# Patient Record
Sex: FEMALE | Race: BLACK OR AFRICAN AMERICAN | Employment: FULL TIME | URBAN - METROPOLITAN AREA
[De-identification: names, ages, dates, MRNs, and addresses within clinical notes are randomized per-mention and may not be internally consistent; named-entity substitution may affect disease eponyms.]

---

## 2018-01-04 RX ORDER — LEVOCETIRIZINE DIHYDROCHLORIDE 5 MG/1
5 TABLET, FILM COATED ORAL 2 TIMES DAILY
COMMUNITY

## 2018-01-08 ENCOUNTER — LABORATORY ENCOUNTER (OUTPATIENT)
Dept: LAB | Facility: HOSPITAL | Age: 43
End: 2018-01-08
Attending: OBSTETRICS & GYNECOLOGY
Payer: COMMERCIAL

## 2018-01-08 DIAGNOSIS — N87.1 CIN II (CERVICAL INTRAEPITHELIAL NEOPLASIA II): ICD-10-CM

## 2018-01-08 LAB
ALBUMIN SERPL-MCNC: 3.8 G/DL (ref 3.5–4.8)
ALP LIVER SERPL-CCNC: 54 U/L (ref 37–98)
ALT SERPL-CCNC: 22 U/L (ref 14–54)
AST SERPL-CCNC: 24 U/L (ref 15–41)
BASOPHILS # BLD AUTO: 0.03 X10(3) UL (ref 0–0.1)
BASOPHILS NFR BLD AUTO: 0.6 %
BILIRUB SERPL-MCNC: 0.3 MG/DL (ref 0.1–2)
BUN BLD-MCNC: 8 MG/DL (ref 8–20)
CALCIUM BLD-MCNC: 8.9 MG/DL (ref 8.3–10.3)
CHLORIDE: 106 MMOL/L (ref 101–111)
CO2: 26 MMOL/L (ref 22–32)
CREAT BLD-MCNC: 0.96 MG/DL (ref 0.55–1.02)
EOSINOPHIL # BLD AUTO: 0.08 X10(3) UL (ref 0–0.3)
EOSINOPHIL NFR BLD AUTO: 1.6 %
ERYTHROCYTE [DISTWIDTH] IN BLOOD BY AUTOMATED COUNT: 15.6 % (ref 11.5–16)
GLUCOSE BLD-MCNC: 84 MG/DL (ref 70–99)
HBV SURFACE AG SERPL QL IA: NONREACTIVE
HCT VFR BLD AUTO: 32.8 % (ref 34–50)
HEPATITIS B SURFACE ANTIGEN INDEX: 0.47
HEPATITIS C VIRUS AB INTERPRETATION: NONREACTIVE
HGB BLD-MCNC: 9.9 G/DL (ref 12–16)
IMMATURE GRANULOCYTE COUNT: 0.01 X10(3) UL (ref 0–1)
IMMATURE GRANULOCYTE RATIO %: 0.2 %
LYMPHOCYTES # BLD AUTO: 1.83 X10(3) UL (ref 0.9–4)
LYMPHOCYTES NFR BLD AUTO: 37.3 %
M PROTEIN MFR SERPL ELPH: 8.4 G/DL (ref 6.1–8.3)
MCH RBC QN AUTO: 23.2 PG (ref 27–33.2)
MCHC RBC AUTO-ENTMCNC: 30.2 G/DL (ref 31–37)
MCV RBC AUTO: 77 FL (ref 81–100)
MONOCYTES # BLD AUTO: 0.53 X10(3) UL (ref 0.1–0.6)
MONOCYTES NFR BLD AUTO: 10.8 %
NEUTROPHIL ABS PRELIM: 2.43 X10 (3) UL (ref 1.3–6.7)
NEUTROPHILS # BLD AUTO: 2.43 X10(3) UL (ref 1.3–6.7)
NEUTROPHILS NFR BLD AUTO: 49.5 %
PLATELET # BLD AUTO: 313 10(3)UL (ref 150–450)
POTASSIUM SERPL-SCNC: 3.6 MMOL/L (ref 3.6–5.1)
RBC # BLD AUTO: 4.26 X10(6)UL (ref 3.8–5.1)
RED CELL DISTRIBUTION WIDTH-SD: 43.2 FL (ref 35.1–46.3)
SODIUM SERPL-SCNC: 138 MMOL/L (ref 136–144)
WBC # BLD AUTO: 4.9 X10(3) UL (ref 4–13)

## 2018-01-08 PROCEDURE — 87340 HEPATITIS B SURFACE AG IA: CPT

## 2018-01-08 PROCEDURE — 80053 COMPREHEN METABOLIC PANEL: CPT

## 2018-01-08 PROCEDURE — 36415 COLL VENOUS BLD VENIPUNCTURE: CPT

## 2018-01-08 PROCEDURE — 86803 HEPATITIS C AB TEST: CPT

## 2018-01-08 PROCEDURE — 87389 HIV-1 AG W/HIV-1&-2 AB AG IA: CPT

## 2018-01-08 PROCEDURE — 85025 COMPLETE CBC W/AUTO DIFF WBC: CPT

## 2018-01-10 ENCOUNTER — ANESTHESIA EVENT (OUTPATIENT)
Dept: SURGERY | Facility: HOSPITAL | Age: 43
End: 2018-01-10
Payer: COMMERCIAL

## 2018-01-10 ENCOUNTER — HOSPITAL ENCOUNTER (OUTPATIENT)
Facility: HOSPITAL | Age: 43
Setting detail: HOSPITAL OUTPATIENT SURGERY
Discharge: HOME | End: 2018-01-10
Attending: OBSTETRICS & GYNECOLOGY | Admitting: OBSTETRICS & GYNECOLOGY
Payer: COMMERCIAL

## 2018-01-10 ENCOUNTER — ANESTHESIA (OUTPATIENT)
Dept: SURGERY | Facility: HOSPITAL | Age: 43
End: 2018-01-10
Payer: COMMERCIAL

## 2018-01-10 ENCOUNTER — SURGERY (OUTPATIENT)
Age: 43
End: 2018-01-10

## 2018-01-10 VITALS
BODY MASS INDEX: 31.81 KG/M2 | SYSTOLIC BLOOD PRESSURE: 115 MMHG | HEART RATE: 84 BPM | HEIGHT: 64 IN | RESPIRATION RATE: 18 BRPM | WEIGHT: 186.31 LBS | OXYGEN SATURATION: 100 % | DIASTOLIC BLOOD PRESSURE: 77 MMHG | TEMPERATURE: 99 F

## 2018-01-10 DIAGNOSIS — N87.1 CIN II (CERVICAL INTRAEPITHELIAL NEOPLASIA II): Primary | ICD-10-CM

## 2018-01-10 LAB
POCT LOT NUMBER: NORMAL
POCT URINE PREGNANCY: NEGATIVE

## 2018-01-10 PROCEDURE — 88305 TISSUE EXAM BY PATHOLOGIST: CPT | Performed by: OBSTETRICS & GYNECOLOGY

## 2018-01-10 PROCEDURE — 88307 TISSUE EXAM BY PATHOLOGIST: CPT | Performed by: OBSTETRICS & GYNECOLOGY

## 2018-01-10 PROCEDURE — 81025 URINE PREGNANCY TEST: CPT | Performed by: OBSTETRICS & GYNECOLOGY

## 2018-01-10 PROCEDURE — 88342 IMHCHEM/IMCYTCHM 1ST ANTB: CPT | Performed by: OBSTETRICS & GYNECOLOGY

## 2018-01-10 PROCEDURE — 0UBC7ZZ EXCISION OF CERVIX, VIA NATURAL OR ARTIFICIAL OPENING: ICD-10-PCS | Performed by: OBSTETRICS & GYNECOLOGY

## 2018-01-10 RX ORDER — SODIUM CHLORIDE, SODIUM LACTATE, POTASSIUM CHLORIDE, CALCIUM CHLORIDE 600; 310; 30; 20 MG/100ML; MG/100ML; MG/100ML; MG/100ML
INJECTION, SOLUTION INTRAVENOUS CONTINUOUS
Status: DISCONTINUED | OUTPATIENT
Start: 2018-01-10 | End: 2018-01-10

## 2018-01-10 RX ORDER — ONDANSETRON 2 MG/ML
4 INJECTION INTRAMUSCULAR; INTRAVENOUS AS NEEDED
Status: DISCONTINUED | OUTPATIENT
Start: 2018-01-10 | End: 2018-01-10

## 2018-01-10 RX ORDER — HYDROCODONE BITARTRATE AND ACETAMINOPHEN 5; 325 MG/1; MG/1
2 TABLET ORAL AS NEEDED
Status: COMPLETED | OUTPATIENT
Start: 2018-01-10 | End: 2018-01-10

## 2018-01-10 RX ORDER — HYDROCODONE BITARTRATE AND ACETAMINOPHEN 5; 325 MG/1; MG/1
1 TABLET ORAL AS NEEDED
Status: COMPLETED | OUTPATIENT
Start: 2018-01-10 | End: 2018-01-10

## 2018-01-10 RX ORDER — NALOXONE HYDROCHLORIDE 0.4 MG/ML
80 INJECTION, SOLUTION INTRAMUSCULAR; INTRAVENOUS; SUBCUTANEOUS AS NEEDED
Status: DISCONTINUED | OUTPATIENT
Start: 2018-01-10 | End: 2018-01-10

## 2018-01-10 NOTE — H&P
[facility]    History and Physical    Romelia Angelucci Patient Status:  Hospital Outpatient Surgery    1975 MRN [id number]   Location [address] [place] [address] PRE OP HOLDING Attending Luli Wilkes   Hosp Day # 0 PCP Camilla Alba MD     Date distress.      Cervical Papanicolaou done within 1 year of adm    Results:     Lab Results  Component Value Date   WBC 4.9 01/08/2018   HGB 9.9 (L) 01/08/2018   HCT 32.8 (L) 01/08/2018   .0 01/08/2018   CREATSERUM 0.96 01/08/2018   BUN 8 01/08/2018

## 2018-01-10 NOTE — ANESTHESIA PREPROCEDURE EVALUATION
PRE-OP EVALUATION    Patient Name: Pilar Cabrera    Pre-op Diagnosis: HSIL/JULIANA 2     Procedure(s):  LOOP ELECTROSURGICAL EXCISION PROCEDURE, ENDOCERVICAL CURETTAGE     Surgeon(s) and Role:     Shakila Ferreira MD - Primary    Pre-op vitals re 01/08/2018   CREATSERUM 0.96 01/08/2018   GLU 84 01/08/2018   CA 8.9 01/08/2018            Airway      Mallampati: I  Mouth opening: >3 FB  TM distance: 4 - 6 cm  Neck ROM: full Cardiovascular      Rhythm: regular  Rate: normal     Dental    No notable den

## 2018-01-11 NOTE — OPERATIVE REPORT
Pre-Operative Diagnosis: HSIL/JULIANA 2      Post-Operative Diagnosis: HSIL/JULIANA 2      Procedure Performed:   Procedure(s):  LOOP ELECTROSURGICAL EXCISION PROCEDURE, ENDOCERVICAL CURETTAGE     Surgeon(s) and Role:     Sofy Hopson MD - Primary

## 2018-01-11 NOTE — BRIEF OP NOTE
Pre-Operative Diagnosis: HSIL/JULIANA 2      Post-Operative Diagnosis: HSIL/JULIANA 2      Procedure Performed:   Procedure(s):  LOOP ELECTROSURGICAL EXCISION PROCEDURE, ENDOCERVICAL CURETTAGE     Surgeon(s) and Role:     Lolita Fitzgerald MD - Primary

## 2018-01-11 NOTE — ANESTHESIA POSTPROCEDURE EVALUATION
2025 [facility] Patient Status:  Hospital Outpatient Surgery   Age/Gender 43year old female MRN [id number]   [facility] SURGERY Attending Prabhjot Haile Day # 0 PCP Kimberlee Joyner MD       Anesthesia Post-

## 2018-11-27 ENCOUNTER — LAB SERVICES (OUTPATIENT)
Dept: OTHER | Age: 43
End: 2018-11-27

## 2018-11-27 ENCOUNTER — HOSPITAL (OUTPATIENT)
Dept: OTHER | Age: 43
End: 2018-11-27
Attending: OBSTETRICS & GYNECOLOGY

## 2018-11-27 LAB
ALBUMIN SERPL-MCNC: 3.5 G/DL (ref 3.6–5.1)
ALBUMIN SERPL-MCNC: 3.5 GM/DL (ref 3.6–5.1)
ALBUMIN/GLOB SERPL: 0.9 {RATIO} (ref 1–2.4)
ALBUMIN/GLOB SERPL: 0.9 {RATIO} (ref 1–2.4)
ALP SERPL-CCNC: 53 UNIT/L (ref 45–117)
ALP SERPL-CCNC: 53 UNITS/L (ref 45–117)
ALT SERPL-CCNC: 20 UNIT/L
ALT SERPL-CCNC: 20 UNITS/L
ANALYZER ANC (IANC): ABNORMAL
ANALYZER ANC (IANC): ABNORMAL
ANION GAP SERPL CALC-SCNC: 8 MMOL/L (ref 10–20)
ANION GAP SERPL CALC-SCNC: 8 MMOL/L (ref 10–20)
AST SERPL-CCNC: 17 UNIT/L
AST SERPL-CCNC: 17 UNITS/L
BILIRUB SERPL-MCNC: 0.2 MG/DL (ref 0.2–1)
BILIRUB SERPL-MCNC: 0.2 MG/DL (ref 0.2–1)
BUN SERPL-MCNC: 11 MG/DL (ref 6–20)
BUN SERPL-MCNC: 11 MG/DL (ref 6–20)
BUN/CREAT SERPL: 12 (ref 7–25)
BUN/CREAT SERPL: 12 (ref 7–25)
CALCIUM SERPL-MCNC: 8.4 MG/DL (ref 8.4–10.2)
CALCIUM SERPL-MCNC: 8.4 MG/DL (ref 8.4–10.2)
CHLORIDE SERPL-SCNC: 109 MMOL/L (ref 98–107)
CHLORIDE: 109 MMOL/L (ref 98–107)
CO2 SERPL-SCNC: 28 MMOL/L (ref 21–32)
CO2 SERPL-SCNC: 28 MMOL/L (ref 21–32)
CREAT SERPL-MCNC: 0.93 MG/DL (ref 0.51–0.95)
CREAT SERPL-MCNC: 0.93 MG/DL (ref 0.51–0.95)
ERYTHROCYTE [DISTWIDTH] IN BLOOD: 18.9 % (ref 11–15)
ERYTHROCYTE [DISTWIDTH] IN BLOOD: 18.9 % (ref 11–15)
GLOBULIN SER-MCNC: 3.8 G/DL (ref 2–4)
GLOBULIN SER-MCNC: 3.8 GM/DL (ref 2–4)
GLUCOSE SERPL-MCNC: 86 MG/DL (ref 65–99)
GLUCOSE SERPL-MCNC: 86 MG/DL (ref 65–99)
HBV SURFACE AG SER QL: NEGATIVE
HBV SURFACE AG SER QL: NEGATIVE
HCG SERPL-ACNC: <2 MUNIT/ML
HCG SERPL-ACNC: <2 MUNITS/ML
HCT VFR BLD CALC: 34 % (ref 36–46.5)
HCV AB SER QL: NEGATIVE
HCV AB SERPL QL IA: NEGATIVE
HEMATOCRIT: 34 % (ref 36–46.5)
HGB BLD-MCNC: 9.9 G/DL (ref 12–15.5)
HGB BLD-MCNC: 9.9 GM/DL (ref 12–15.5)
HIV 1+2 AB+HIV1 P24 AG SERPL QL IA: NONREACTIVE
HIV ANTIGEN/ANTIBODY SCREEN: NONREACTIVE
MCH RBC QN AUTO: 23.1 PG (ref 26–34)
MCH RBC QN AUTO: 23.1 PG (ref 26–34)
MCHC RBC AUTO-ENTMCNC: 29.1 G/DL (ref 32–36.5)
MCHC RBC AUTO-ENTMCNC: 29.1 GM/DL (ref 32–36.5)
MCV RBC AUTO: 79.4 FL (ref 78–100)
MCV RBC AUTO: 79.4 FL (ref 78–100)
NRBC (NRBCRE): 0 /100 WBC
NRBC (NRBCRE): 0 /100 WBC
PLATELET # BLD: 328 K/MCL (ref 140–450)
PLATELET # BLD: 328 THOUSAND/MCL (ref 140–450)
POTASSIUM SERPL-SCNC: 4.1 MMOL/L (ref 3.4–5.1)
POTASSIUM SERPL-SCNC: 4.1 MMOL/L (ref 3.4–5.1)
PROT SERPL-MCNC: 7.3 G/DL (ref 6.4–8.2)
PROT SERPL-MCNC: 7.3 GM/DL (ref 6.4–8.2)
RBC # BLD: 4.28 MIL/MCL (ref 4–5.2)
RBC # BLD: 4.28 MILLION/MCL (ref 4–5.2)
SODIUM SERPL-SCNC: 141 MMOL/L (ref 135–145)
SODIUM SERPL-SCNC: 141 MMOL/L (ref 135–145)
WBC # BLD: 3.1 K/MCL (ref 4.2–11)
WBC # BLD: 3.1 THOUSAND/MCL (ref 4.2–11)

## 2018-11-29 ENCOUNTER — HOSPITAL (OUTPATIENT)
Dept: OTHER | Age: 43
End: 2018-11-29
Attending: OBSTETRICS & GYNECOLOGY

## 2018-11-29 ENCOUNTER — CHARTING TRANS (OUTPATIENT)
Dept: OTHER | Age: 43
End: 2018-11-29

## 2018-12-03 LAB — PATHOLOGIST NAME: NORMAL

## 2019-05-20 ENCOUNTER — WALK IN (OUTPATIENT)
Dept: URGENT CARE | Age: 44
End: 2019-05-20

## 2019-05-20 VITALS
HEART RATE: 80 BPM | BODY MASS INDEX: 30.45 KG/M2 | TEMPERATURE: 98.3 F | HEIGHT: 64 IN | OXYGEN SATURATION: 100 % | WEIGHT: 178.35 LBS

## 2019-05-20 DIAGNOSIS — J40 BRONCHITIS: Primary | ICD-10-CM

## 2019-05-20 PROCEDURE — X1094 NO CHARGE VISIT: HCPCS | Performed by: NURSE PRACTITIONER

## 2020-01-14 ENCOUNTER — OFFICE VISIT (OUTPATIENT)
Dept: FAMILY MEDICINE CLINIC | Facility: CLINIC | Age: 45
End: 2020-01-14
Payer: COMMERCIAL

## 2020-01-14 VITALS
RESPIRATION RATE: 17 BRPM | BODY MASS INDEX: 32.27 KG/M2 | SYSTOLIC BLOOD PRESSURE: 102 MMHG | DIASTOLIC BLOOD PRESSURE: 70 MMHG | HEART RATE: 86 BPM | WEIGHT: 189 LBS | HEIGHT: 64 IN

## 2020-01-14 DIAGNOSIS — Z00.00 ENCOUNTER FOR PREVENTIVE CARE: ICD-10-CM

## 2020-01-14 DIAGNOSIS — Z87.09 HISTORY OF ASTHMA: ICD-10-CM

## 2020-01-14 DIAGNOSIS — Z13.29 THYROID DISORDER SCREEN: ICD-10-CM

## 2020-01-14 DIAGNOSIS — M77.11 LATERAL EPICONDYLITIS OF RIGHT ELBOW: ICD-10-CM

## 2020-01-14 DIAGNOSIS — Z13.220 LIPID SCREENING: ICD-10-CM

## 2020-01-14 DIAGNOSIS — Z76.89 ESTABLISHING CARE WITH NEW DOCTOR, ENCOUNTER FOR: Primary | ICD-10-CM

## 2020-01-14 PROCEDURE — 99396 PREV VISIT EST AGE 40-64: CPT | Performed by: FAMILY MEDICINE

## 2020-01-14 NOTE — PROGRESS NOTES
HPI:    Patient ID: Jaden Prajapati is a 40year old female.     Patient is a 66-year-old -American female here for establishing care physical and for status update on any confirmed chronic medical illnesses and follow up on any previous labs or pr sounds normal.   Abdominal: Soft. Bowel sounds are normal. She exhibits no distension. Neurological: She is alert and oriented to person, place, and time. ASSESSMENT/PLAN:   1. Establishing care with new doctor, encounter for  Established.

## 2020-01-20 ENCOUNTER — LAB ENCOUNTER (OUTPATIENT)
Dept: LAB | Age: 45
End: 2020-01-20
Attending: FAMILY MEDICINE
Payer: COMMERCIAL

## 2020-01-20 DIAGNOSIS — Z13.29 THYROID DISORDER SCREEN: ICD-10-CM

## 2020-01-20 DIAGNOSIS — Z13.220 LIPID SCREENING: ICD-10-CM

## 2020-01-20 DIAGNOSIS — Z00.00 ENCOUNTER FOR PREVENTIVE CARE: ICD-10-CM

## 2020-01-20 LAB
ALBUMIN SERPL-MCNC: 3.4 G/DL (ref 3.4–5)
ALBUMIN/GLOB SERPL: 0.8 {RATIO} (ref 1–2)
ALP LIVER SERPL-CCNC: 48 U/L (ref 37–98)
ALT SERPL-CCNC: 18 U/L (ref 13–56)
ANION GAP SERPL CALC-SCNC: 4 MMOL/L (ref 0–18)
AST SERPL-CCNC: 15 U/L (ref 15–37)
BASOPHILS # BLD AUTO: 0.02 X10(3) UL (ref 0–0.2)
BASOPHILS NFR BLD AUTO: 0.5 %
BILIRUB SERPL-MCNC: 0.3 MG/DL (ref 0.1–2)
BILIRUB UR QL: NEGATIVE
BUN BLD-MCNC: 9 MG/DL (ref 7–18)
BUN/CREAT SERPL: 8.8 (ref 10–20)
CALCIUM BLD-MCNC: 8.1 MG/DL (ref 8.5–10.1)
CHLORIDE SERPL-SCNC: 111 MMOL/L (ref 98–112)
CHOLEST SMN-MCNC: 170 MG/DL (ref ?–200)
CLARITY UR: CLEAR
CO2 SERPL-SCNC: 26 MMOL/L (ref 21–32)
COLOR UR: YELLOW
CREAT BLD-MCNC: 1.02 MG/DL (ref 0.55–1.02)
DEPRECATED RDW RBC AUTO: 43.7 FL (ref 35.1–46.3)
EOSINOPHIL # BLD AUTO: 0.08 X10(3) UL (ref 0–0.7)
EOSINOPHIL NFR BLD AUTO: 2.1 %
ERYTHROCYTE [DISTWIDTH] IN BLOOD BY AUTOMATED COUNT: 12.8 % (ref 11–15)
GLOBULIN PLAS-MCNC: 4.2 G/DL (ref 2.8–4.4)
GLUCOSE BLD-MCNC: 93 MG/DL (ref 70–99)
GLUCOSE UR-MCNC: NEGATIVE MG/DL
HCT VFR BLD AUTO: 41.2 % (ref 35–48)
HDLC SERPL-MCNC: 44 MG/DL (ref 40–59)
HGB BLD-MCNC: 13.4 G/DL (ref 12–16)
HGB UR QL STRIP.AUTO: NEGATIVE
IMM GRANULOCYTES # BLD AUTO: 0.01 X10(3) UL (ref 0–1)
IMM GRANULOCYTES NFR BLD: 0.3 %
KETONES UR-MCNC: NEGATIVE MG/DL
LDLC SERPL CALC-MCNC: 110 MG/DL (ref ?–100)
LEUKOCYTE ESTERASE UR QL STRIP.AUTO: NEGATIVE
LYMPHOCYTES # BLD AUTO: 1.42 X10(3) UL (ref 1–4)
LYMPHOCYTES NFR BLD AUTO: 36.7 %
M PROTEIN MFR SERPL ELPH: 7.6 G/DL (ref 6.4–8.2)
MCH RBC QN AUTO: 30 PG (ref 26–34)
MCHC RBC AUTO-ENTMCNC: 32.5 G/DL (ref 31–37)
MCV RBC AUTO: 92.2 FL (ref 80–100)
MONOCYTES # BLD AUTO: 0.58 X10(3) UL (ref 0.1–1)
MONOCYTES NFR BLD AUTO: 15 %
NEUTROPHILS # BLD AUTO: 1.76 X10 (3) UL (ref 1.5–7.7)
NEUTROPHILS # BLD AUTO: 1.76 X10(3) UL (ref 1.5–7.7)
NEUTROPHILS NFR BLD AUTO: 45.4 %
NITRITE UR QL STRIP.AUTO: NEGATIVE
NONHDLC SERPL-MCNC: 126 MG/DL (ref ?–130)
OSMOLALITY SERPL CALC.SUM OF ELEC: 290 MOSM/KG (ref 275–295)
PATIENT FASTING Y/N/NP: YES
PATIENT FASTING Y/N/NP: YES
PH UR: 6 [PH] (ref 5–8)
PLATELET # BLD AUTO: 272 10(3)UL (ref 150–450)
POTASSIUM SERPL-SCNC: 4 MMOL/L (ref 3.5–5.1)
PROT UR-MCNC: NEGATIVE MG/DL
RBC # BLD AUTO: 4.47 X10(6)UL (ref 3.8–5.3)
SODIUM SERPL-SCNC: 141 MMOL/L (ref 136–145)
SP GR UR STRIP: 1.02 (ref 1–1.03)
TRIGL SERPL-MCNC: 78 MG/DL (ref 30–149)
TSI SER-ACNC: 1.06 MIU/ML (ref 0.36–3.74)
UROBILINOGEN UR STRIP-ACNC: <2
VLDLC SERPL CALC-MCNC: 16 MG/DL (ref 0–30)
WBC # BLD AUTO: 3.9 X10(3) UL (ref 4–11)

## 2020-01-20 PROCEDURE — 80053 COMPREHEN METABOLIC PANEL: CPT

## 2020-01-20 PROCEDURE — 84443 ASSAY THYROID STIM HORMONE: CPT

## 2020-01-20 PROCEDURE — 80061 LIPID PANEL: CPT

## 2020-01-20 PROCEDURE — 85025 COMPLETE CBC W/AUTO DIFF WBC: CPT

## 2020-01-20 PROCEDURE — 36415 COLL VENOUS BLD VENIPUNCTURE: CPT

## 2020-01-20 PROCEDURE — 81003 URINALYSIS AUTO W/O SCOPE: CPT

## 2020-02-18 ENCOUNTER — TELEPHONE (OUTPATIENT)
Dept: FAMILY MEDICINE CLINIC | Facility: CLINIC | Age: 45
End: 2020-02-18

## 2020-02-18 DIAGNOSIS — E55.9 VITAMIN D INSUFFICIENCY: ICD-10-CM

## 2020-02-18 DIAGNOSIS — E78.00 ELEVATED LDL CHOLESTEROL LEVEL: Primary | ICD-10-CM

## 2020-02-18 NOTE — TELEPHONE ENCOUNTER
Dr Ana María Cerda, please advise. Order for vitamin D pended, please add diagnosis, advise. Advised patient on Dr. Tamela Liang information and recommendations. Patient verbalized understanding.  She asked if she could get a vitamin D, she said she has had a low

## 2020-04-27 ENCOUNTER — PATIENT MESSAGE (OUTPATIENT)
Dept: FAMILY MEDICINE CLINIC | Facility: CLINIC | Age: 45
End: 2020-04-27

## 2020-04-27 DIAGNOSIS — E55.9 VITAMIN D DEFICIENCY: Primary | ICD-10-CM

## 2020-04-27 RX ORDER — ERGOCALCIFEROL 1.25 MG/1
50000 CAPSULE ORAL WEEKLY
Qty: 12 CAPSULE | Refills: 0 | Status: SHIPPED | OUTPATIENT
Start: 2020-04-27 | End: 2020-05-27

## 2020-04-27 NOTE — TELEPHONE ENCOUNTER
From: Blanka Jaimes  To: Jamar Dubon DO  Sent: 4/27/2020 10:24 AM CDT  Subject: Test Results Question    Hi Dr. Lacy Landis,    I pray all is well. I had my Vitamin D level checked last month with other IVF bloodwork. It is 23.  I've been [employer]

## 2020-04-27 NOTE — TELEPHONE ENCOUNTER
From: Karyle Peals  To: Chdii Srivastava, DO  Sent: 4/27/2020 5:45 PM CDT  Subject: Other    Yes, I am okay with taking the additional amount of Vitamin D. Thank you Dr. Lisandra Desir

## 2020-08-14 ENCOUNTER — APPOINTMENT (OUTPATIENT)
Dept: LAB | Facility: REFERENCE LAB | Age: 45
End: 2020-08-14
Attending: FAMILY MEDICINE
Payer: COMMERCIAL

## 2020-08-14 DIAGNOSIS — E55.9 VITAMIN D INSUFFICIENCY: ICD-10-CM

## 2020-08-14 DIAGNOSIS — E78.00 ELEVATED LDL CHOLESTEROL LEVEL: ICD-10-CM

## 2020-08-14 LAB
25(OH)D3 SERPL-MCNC: 49 NG/ML (ref 30–100)
CHOLEST SMN-MCNC: 148 MG/DL (ref ?–200)
HDLC SERPL-MCNC: 46 MG/DL (ref 40–59)
LDLC SERPL CALC-MCNC: 84 MG/DL (ref ?–100)
NONHDLC SERPL-MCNC: 102 MG/DL (ref ?–130)
PATIENT FASTING Y/N/NP: NO
TRIGL SERPL-MCNC: 92 MG/DL (ref 30–149)
VLDLC SERPL CALC-MCNC: 18 MG/DL (ref 0–30)

## 2020-08-14 PROCEDURE — 80061 LIPID PANEL: CPT

## 2020-08-14 PROCEDURE — 36415 COLL VENOUS BLD VENIPUNCTURE: CPT

## 2020-08-14 PROCEDURE — 82306 VITAMIN D 25 HYDROXY: CPT

## 2020-09-05 ENCOUNTER — APPOINTMENT (OUTPATIENT)
Dept: LAB | Facility: HOSPITAL | Age: 45
End: 2020-09-05
Attending: OBSTETRICS & GYNECOLOGY
Payer: COMMERCIAL

## 2020-09-05 DIAGNOSIS — Z01.812 PRE-OPERATIVE LABORATORY EXAMINATION: ICD-10-CM

## 2020-09-07 LAB — SARS-COV-2 RNA RESP QL NAA+PROBE: NOT DETECTED

## 2020-09-30 ENCOUNTER — IMMUNIZATION (OUTPATIENT)
Dept: FAMILY MEDICINE CLINIC | Facility: CLINIC | Age: 45
End: 2020-09-30
Payer: COMMERCIAL

## 2020-09-30 DIAGNOSIS — Z23 NEED FOR VACCINATION: ICD-10-CM

## 2020-09-30 PROCEDURE — 90686 IIV4 VACC NO PRSV 0.5 ML IM: CPT | Performed by: FAMILY MEDICINE

## 2020-09-30 PROCEDURE — 90471 IMMUNIZATION ADMIN: CPT | Performed by: FAMILY MEDICINE

## 2021-03-12 DIAGNOSIS — Z23 NEED FOR VACCINATION: ICD-10-CM

## 2021-03-18 ENCOUNTER — OFFICE VISIT (OUTPATIENT)
Dept: FAMILY MEDICINE CLINIC | Facility: CLINIC | Age: 46
End: 2021-03-18
Payer: COMMERCIAL

## 2021-03-18 VITALS
TEMPERATURE: 97 F | HEIGHT: 64 IN | DIASTOLIC BLOOD PRESSURE: 79 MMHG | BODY MASS INDEX: 32.44 KG/M2 | WEIGHT: 190 LBS | SYSTOLIC BLOOD PRESSURE: 115 MMHG | HEART RATE: 78 BPM

## 2021-03-18 DIAGNOSIS — Z00.00 PHYSICAL EXAM, ROUTINE: Primary | ICD-10-CM

## 2021-03-18 PROCEDURE — 3074F SYST BP LT 130 MM HG: CPT | Performed by: FAMILY MEDICINE

## 2021-03-18 PROCEDURE — 3008F BODY MASS INDEX DOCD: CPT | Performed by: FAMILY MEDICINE

## 2021-03-18 PROCEDURE — 99396 PREV VISIT EST AGE 40-64: CPT | Performed by: FAMILY MEDICINE

## 2021-03-18 PROCEDURE — 3078F DIAST BP <80 MM HG: CPT | Performed by: FAMILY MEDICINE

## 2021-03-18 PROCEDURE — 93000 ELECTROCARDIOGRAM COMPLETE: CPT | Performed by: FAMILY MEDICINE

## 2021-03-18 NOTE — PATIENT INSTRUCTIONS
All adult screening ordered and done appropriate for patient's age and gender and risk factors and complaints. Monitor blood pressures and record at home. Limit salt intake. Encouraged physical fitness and daily physical activity daily.   Comply with medi

## 2021-03-18 NOTE — PROGRESS NOTES
HPI/Subjective:   Patient ID: Mae Nobles is a 39year old female.     This patient is a 51-year-old -American female here for complete preventive care physical and for status update on any confirmed chronic medical illnesses and follow up on a present. Mental Status: She is alert and oriented to person, place, and time. Assessment & Plan:   1. Physical exam, routine  General well exam the following have been ordered. Colonoscopy for women  - LIPID PANEL;  Future  - COMP METABOLIC PA

## 2021-03-19 ENCOUNTER — LAB ENCOUNTER (OUTPATIENT)
Dept: LAB | Age: 46
End: 2021-03-19
Attending: FAMILY MEDICINE
Payer: COMMERCIAL

## 2021-03-19 DIAGNOSIS — Z00.00 PHYSICAL EXAM, ROUTINE: ICD-10-CM

## 2021-03-19 LAB
ALBUMIN SERPL-MCNC: 3.8 G/DL (ref 3.4–5)
ALBUMIN/GLOB SERPL: 1 {RATIO} (ref 1–2)
ALP LIVER SERPL-CCNC: 59 U/L
ALT SERPL-CCNC: 33 U/L
ANION GAP SERPL CALC-SCNC: 1 MMOL/L (ref 0–18)
AST SERPL-CCNC: 26 U/L (ref 15–37)
BASOPHILS # BLD AUTO: 0.04 X10(3) UL (ref 0–0.2)
BASOPHILS NFR BLD AUTO: 1.1 %
BILIRUB SERPL-MCNC: 0.4 MG/DL (ref 0.1–2)
BILIRUB UR QL: NEGATIVE
BUN BLD-MCNC: 9 MG/DL (ref 7–18)
BUN/CREAT SERPL: 9.3 (ref 10–20)
CALCIUM BLD-MCNC: 9.3 MG/DL (ref 8.5–10.1)
CHLORIDE SERPL-SCNC: 108 MMOL/L (ref 98–112)
CHOLEST SMN-MCNC: 162 MG/DL (ref ?–200)
CLARITY UR: CLEAR
CO2 SERPL-SCNC: 31 MMOL/L (ref 21–32)
COLOR UR: YELLOW
CREAT BLD-MCNC: 0.97 MG/DL
DEPRECATED RDW RBC AUTO: 42.7 FL (ref 35.1–46.3)
EOSINOPHIL # BLD AUTO: 0.1 X10(3) UL (ref 0–0.7)
EOSINOPHIL NFR BLD AUTO: 2.7 %
ERYTHROCYTE [DISTWIDTH] IN BLOOD BY AUTOMATED COUNT: 12.6 % (ref 11–15)
GLOBULIN PLAS-MCNC: 4 G/DL (ref 2.8–4.4)
GLUCOSE BLD-MCNC: 95 MG/DL (ref 70–99)
GLUCOSE UR-MCNC: NEGATIVE MG/DL
HCT VFR BLD AUTO: 42.4 %
HDLC SERPL-MCNC: 40 MG/DL (ref 40–59)
HGB BLD-MCNC: 13.5 G/DL
IMM GRANULOCYTES # BLD AUTO: 0.01 X10(3) UL (ref 0–1)
IMM GRANULOCYTES NFR BLD: 0.3 %
KETONES UR-MCNC: NEGATIVE MG/DL
LDLC SERPL CALC-MCNC: 100 MG/DL (ref ?–100)
LEUKOCYTE ESTERASE UR QL STRIP.AUTO: NEGATIVE
LYMPHOCYTES # BLD AUTO: 1.37 X10(3) UL (ref 1–4)
LYMPHOCYTES NFR BLD AUTO: 36.8 %
M PROTEIN MFR SERPL ELPH: 7.8 G/DL (ref 6.4–8.2)
MCH RBC QN AUTO: 29.6 PG (ref 26–34)
MCHC RBC AUTO-ENTMCNC: 31.8 G/DL (ref 31–37)
MCV RBC AUTO: 93 FL
MONOCYTES # BLD AUTO: 0.5 X10(3) UL (ref 0.1–1)
MONOCYTES NFR BLD AUTO: 13.4 %
NEUTROPHILS # BLD AUTO: 1.7 X10 (3) UL (ref 1.5–7.7)
NEUTROPHILS # BLD AUTO: 1.7 X10(3) UL (ref 1.5–7.7)
NEUTROPHILS NFR BLD AUTO: 45.7 %
NITRITE UR QL STRIP.AUTO: NEGATIVE
NONHDLC SERPL-MCNC: 122 MG/DL (ref ?–130)
OSMOLALITY SERPL CALC.SUM OF ELEC: 288 MOSM/KG (ref 275–295)
PATIENT FASTING Y/N/NP: YES
PATIENT FASTING Y/N/NP: YES
PH UR: 7 [PH] (ref 5–8)
PLATELET # BLD AUTO: 288 10(3)UL (ref 150–450)
POTASSIUM SERPL-SCNC: 3.8 MMOL/L (ref 3.5–5.1)
PROT UR-MCNC: NEGATIVE MG/DL
RBC # BLD AUTO: 4.56 X10(6)UL
SODIUM SERPL-SCNC: 140 MMOL/L (ref 136–145)
SP GR UR STRIP: 1.01 (ref 1–1.03)
TRIGL SERPL-MCNC: 110 MG/DL (ref 30–149)
TSI SER-ACNC: 0.88 MIU/ML (ref 0.36–3.74)
UROBILINOGEN UR STRIP-ACNC: <2
VLDLC SERPL CALC-MCNC: 22 MG/DL (ref 0–30)
WBC # BLD AUTO: 3.7 X10(3) UL (ref 4–11)

## 2021-03-19 PROCEDURE — 36415 COLL VENOUS BLD VENIPUNCTURE: CPT

## 2021-03-19 PROCEDURE — 81001 URINALYSIS AUTO W/SCOPE: CPT

## 2021-03-19 PROCEDURE — 80061 LIPID PANEL: CPT

## 2021-03-19 PROCEDURE — 80053 COMPREHEN METABOLIC PANEL: CPT

## 2021-03-19 PROCEDURE — 85025 COMPLETE CBC W/AUTO DIFF WBC: CPT

## 2021-03-19 PROCEDURE — 84443 ASSAY THYROID STIM HORMONE: CPT

## 2021-10-06 ENCOUNTER — NURSE TRIAGE (OUTPATIENT)
Dept: FAMILY MEDICINE CLINIC | Facility: CLINIC | Age: 46
End: 2021-10-06

## 2021-10-06 NOTE — TELEPHONE ENCOUNTER
----- Message from [facility]. Sebastian Birmingham sent at 10/6/2021 10:42 AM CDT -----  Regarding: Throat pain  Hi Dr. Abdias Lane,     I am experiencing some throat discomfort that has gotten worse over the last 2 days. I had some dental work done last Thursday (bone murali) and have been taking antibiotics, ibuprofen 600 and tylenol 3 since. I initially thought I was having a strange reaction but am wondering if I have strep throat. It now hurts to swallow. Could I schedule an appointment to test for it?     ~Kaur Birmingham C/o r foot pain, nausea since today

## 2021-10-07 ENCOUNTER — HOSPITAL ENCOUNTER (OUTPATIENT)
Age: 46
Discharge: HOME | End: 2021-10-07
Payer: COMMERCIAL

## 2021-10-07 VITALS
HEART RATE: 81 BPM | OXYGEN SATURATION: 100 % | SYSTOLIC BLOOD PRESSURE: 115 MMHG | RESPIRATION RATE: 19 BRPM | DIASTOLIC BLOOD PRESSURE: 74 MMHG | TEMPERATURE: 98 F

## 2021-10-07 DIAGNOSIS — Z20.822 ENCOUNTER FOR SCREENING LABORATORY TESTING FOR COVID-19 VIRUS: Primary | ICD-10-CM

## 2021-10-07 DIAGNOSIS — Z20.822 LAB TEST NEGATIVE FOR COVID-19 VIRUS: ICD-10-CM

## 2021-10-07 DIAGNOSIS — J02.9 VIRAL PHARYNGITIS: ICD-10-CM

## 2021-10-07 PROCEDURE — U0002 COVID-19 LAB TEST NON-CDC: HCPCS | Performed by: NURSE PRACTITIONER

## 2021-10-07 PROCEDURE — 99203 OFFICE O/P NEW LOW 30 MIN: CPT | Performed by: NURSE PRACTITIONER

## 2021-10-07 PROCEDURE — 87880 STREP A ASSAY W/OPTIC: CPT | Performed by: NURSE PRACTITIONER

## 2021-10-07 NOTE — ED PROVIDER NOTES
Patient Seen in: Im[facility]      History   Patient presents with:  Sore Throat  Testing    Stated Complaint: Sore throat; Covid and strep test    Subjective:   HPI    This is a 80-year-old female presenting with a sore throat.   Patient state Rate and Rhythm: Normal rate. Heart sounds: Normal heart sounds. Pulmonary:      Effort: Pulmonary effort is normal.      Breath sounds: Normal breath sounds. Musculoskeletal:         General: Normal range of motion.       Cervical back: Normal ra

## 2021-11-03 ENCOUNTER — TELEPHONE (OUTPATIENT)
Dept: FAMILY MEDICINE CLINIC | Facility: CLINIC | Age: 46
End: 2021-11-03

## 2021-11-03 DIAGNOSIS — Z12.11 SCREENING FOR COLON CANCER: Primary | ICD-10-CM

## 2021-11-03 NOTE — TELEPHONE ENCOUNTER
Referral on chart for GI for colonoscopy. Call patient. Patient is not in a high risk category regarding the need for COVID-19 booster. No booster needed for now.

## 2021-11-03 NOTE — TELEPHONE ENCOUNTER
Patient is asking for an order to be put in for her colonoscopy and if she can be notified when it is avail for her to call and schedule,, she is also wanting to know if dr. Ct Summers recommends her to get the J and J booster shot.       Pls advise

## 2021-11-03 NOTE — TELEPHONE ENCOUNTER
Dr. Ana María Cerda, patient is requesting referral for Colonoscopy. Referral pended. Patient is asking if she should receive the [employer] booster vaccine? Please advise.

## 2021-11-09 ENCOUNTER — IMMUNIZATION (OUTPATIENT)
Dept: FAMILY MEDICINE CLINIC | Facility: CLINIC | Age: 46
End: 2021-11-09
Payer: COMMERCIAL

## 2021-11-09 DIAGNOSIS — Z23 NEED FOR VACCINATION: Primary | ICD-10-CM

## 2021-11-09 PROCEDURE — 90471 IMMUNIZATION ADMIN: CPT | Performed by: FAMILY MEDICINE

## 2021-11-09 PROCEDURE — 90686 IIV4 VACC NO PRSV 0.5 ML IM: CPT | Performed by: FAMILY MEDICINE

## 2021-11-18 ENCOUNTER — PATIENT MESSAGE (OUTPATIENT)
Dept: FAMILY MEDICINE CLINIC | Facility: CLINIC | Age: 46
End: 2021-11-18

## 2021-11-18 NOTE — TELEPHONE ENCOUNTER
The medical records generated by this patient's dentist have been reviewed and they definitely warrant testing to rule out sleep apnea. An order for home sleep study has been placed on the chart.   Please call the patient and let her know and a message can

## 2021-11-19 NOTE — TELEPHONE ENCOUNTER
Note from Dr. Fernando Plascencia on Home Sleep Study order:     This is a 70-year-old female patient with a myriad of clinical findings by her dentist and subjective complaints including snoring and frequent night awakenings and daytime somnolence which is highly sug

## 2021-11-22 ENCOUNTER — PATIENT MESSAGE (OUTPATIENT)
Dept: FAMILY MEDICINE CLINIC | Facility: CLINIC | Age: 46
End: 2021-11-22

## 2021-11-26 ENCOUNTER — NURSE ONLY (OUTPATIENT)
Dept: GASTROENTEROLOGY | Facility: CLINIC | Age: 46
End: 2021-11-26

## 2021-11-26 ENCOUNTER — TELEPHONE (OUTPATIENT)
Dept: FAMILY MEDICINE CLINIC | Facility: CLINIC | Age: 46
End: 2021-11-26

## 2021-11-26 DIAGNOSIS — Z12.12 SCREENING FOR COLORECTAL CANCER: Primary | ICD-10-CM

## 2021-11-26 DIAGNOSIS — Z12.11 SCREENING FOR COLORECTAL CANCER: Primary | ICD-10-CM

## 2021-11-26 NOTE — TELEPHONE ENCOUNTER
Sleep Center - Please call patient to schedule. Voicemail left today with your department regarding this patient. Melissa Dewey RN - [place]. Also note 11/18/21 and 11/22/21 Patient Messages. Patient called office. RN spoke with patient.  Patient's date of

## 2021-11-26 NOTE — PROGRESS NOTES
Dr. Orlando Stringer patient for her scheduled telephone colon screening.      3/19/2021 CBC WNL    First colonoscopy     Anticoagulants: no   Diabetic Meds:  No   BP meds(Ace inhibitors/ARB's): no   Weight loss meds (phentermine/vyvanse): no   Iron suppl

## 2021-11-29 RX ORDER — SODIUM, POTASSIUM,MAG SULFATES 17.5-3.13G
SOLUTION, RECONSTITUTED, ORAL ORAL
Qty: 1 EACH | Refills: 0 | Status: SHIPPED | OUTPATIENT
Start: 2021-11-29

## 2021-11-30 ENCOUNTER — TELEPHONE (OUTPATIENT)
Dept: FAMILY MEDICINE CLINIC | Facility: CLINIC | Age: 46
End: 2021-11-30

## 2021-11-30 NOTE — TELEPHONE ENCOUNTER
The note has been signed and please call the patient and let her know that I will try to call her between 5 PM and 6 PM on my commute to home on today which is Tuesday, November 30, 2021.

## 2021-11-30 NOTE — TELEPHONE ENCOUNTER
Patient states that she is having anxiety about her dads recent death. She made an appointment to see Dr. Bonny Wood for next month. She is requesting a note for her job to excuse her from work and will be faxing [facility] paper work for herself.  Note pending

## 2021-11-30 NOTE — PROGRESS NOTES
Thank you Devika Chun. Office visit with Dr. Melissa Pitts 3/18/2021 reviewed.     GI schedulers –    Please schedule colonoscopy exam at [facility] (Singh Cespedes)    BMI Readings from Last 1 Encounters:  03/18/21 : 32.61 kg/m²     M

## 2021-11-30 NOTE — PROGRESS NOTES
Scheduled for:  Colonoscopy [address]    Provider Name:  Dr. Amber Tavares   Date:  3/22/2022  Location:  [facility] [place]  Sedation:  MAC  Time:  9:30 am (pt is aware to arrive at 8:30 am)  Prep:  Split dose Suprep  Meds/Allergies Reconciled?:  Physician reviewed  Diagnosis with

## 2021-12-03 ENCOUNTER — TELEPHONE (OUTPATIENT)
Dept: FAMILY MEDICINE CLINIC | Facility: CLINIC | Age: 46
End: 2021-12-03

## 2021-12-03 NOTE — TELEPHONE ENCOUNTER
Spoke with patient for more info. States she was expecting a call from Dr. Kiersten Zaman on 11/30 after his clinic, see telephone encounter 11/30.   She was able to access the letter on [employer] but \"there is more to it than that and I want the doctor to call m

## 2021-12-10 NOTE — TELEPHONE ENCOUNTER
Called the Sleep Study department to f/u and make sure patient was contacted. Appointment confirmed, patient will go in February to obtain supplies.

## 2021-12-13 ENCOUNTER — OFFICE VISIT (OUTPATIENT)
Dept: FAMILY MEDICINE CLINIC | Facility: CLINIC | Age: 46
End: 2021-12-13
Payer: COMMERCIAL

## 2021-12-13 ENCOUNTER — LAB ENCOUNTER (OUTPATIENT)
Dept: LAB | Age: 46
End: 2021-12-13
Attending: FAMILY MEDICINE
Payer: COMMERCIAL

## 2021-12-13 VITALS
DIASTOLIC BLOOD PRESSURE: 75 MMHG | WEIGHT: 187 LBS | SYSTOLIC BLOOD PRESSURE: 122 MMHG | BODY MASS INDEX: 31.92 KG/M2 | HEART RATE: 88 BPM | HEIGHT: 64 IN | RESPIRATION RATE: 18 BRPM

## 2021-12-13 DIAGNOSIS — E55.9 VITAMIN D INSUFFICIENCY: ICD-10-CM

## 2021-12-13 DIAGNOSIS — Z82.61 FAMILY HISTORY OF RHEUMATOID ARTHRITIS: ICD-10-CM

## 2021-12-13 DIAGNOSIS — Z63.4 BEREAVEMENT REACTION: ICD-10-CM

## 2021-12-13 DIAGNOSIS — F41.9 ANXIETY: ICD-10-CM

## 2021-12-13 DIAGNOSIS — J45.20 MILD INTERMITTENT ASTHMA WITHOUT COMPLICATION: Primary | ICD-10-CM

## 2021-12-13 DIAGNOSIS — F43.20 BEREAVEMENT REACTION: ICD-10-CM

## 2021-12-13 PROCEDURE — 86431 RHEUMATOID FACTOR QUANT: CPT

## 2021-12-13 PROCEDURE — 36415 COLL VENOUS BLD VENIPUNCTURE: CPT

## 2021-12-13 PROCEDURE — 99214 OFFICE O/P EST MOD 30 MIN: CPT | Performed by: FAMILY MEDICINE

## 2021-12-13 PROCEDURE — 3074F SYST BP LT 130 MM HG: CPT | Performed by: FAMILY MEDICINE

## 2021-12-13 PROCEDURE — 86039 ANTINUCLEAR ANTIBODIES (ANA): CPT

## 2021-12-13 PROCEDURE — 3008F BODY MASS INDEX DOCD: CPT | Performed by: FAMILY MEDICINE

## 2021-12-13 PROCEDURE — 86038 ANTINUCLEAR ANTIBODIES: CPT

## 2021-12-13 PROCEDURE — 82306 VITAMIN D 25 HYDROXY: CPT

## 2021-12-13 PROCEDURE — 3078F DIAST BP <80 MM HG: CPT | Performed by: FAMILY MEDICINE

## 2021-12-13 RX ORDER — ALBUTEROL SULFATE 90 UG/1
2 AEROSOL, METERED RESPIRATORY (INHALATION) EVERY 6 HOURS PRN
Qty: 1 EACH | Refills: 3 | Status: SHIPPED | OUTPATIENT
Start: 2021-12-13

## 2021-12-13 SDOH — SOCIAL STABILITY - SOCIAL INSECURITY: DISSAPEARANCE AND DEATH OF FAMILY MEMBER: Z63.4

## 2021-12-13 NOTE — PROGRESS NOTES
Subjective:   Patient ID: Jamie Nieves is a 55year old female.     This patient is a 41-year-old -American female well-established in our clinic who already sees a counselor on a regular basis unfortunately just lost her father and her anxiety recommend continuation of counseling. Ashwagandha to be reviewed. 2. Bereavement reaction  Expected. Will monitor. 3. Mild intermittent asthma without complication  Medication reviewed and renewed where needed and appropriate.   - albuterol 108 (90 B

## 2021-12-13 NOTE — PATIENT INSTRUCTIONS
We will do a screen on the patient's vitamin D3 level as well as RA screen in lieu of the patient's sister now having a confirmed diagnosis of rheumatoid arthritis.

## 2021-12-21 DIAGNOSIS — R76.8 RHEUMATOID FACTOR POSITIVE: ICD-10-CM

## 2021-12-21 DIAGNOSIS — R76.8 POSITIVE ANA (ANTINUCLEAR ANTIBODY): Primary | ICD-10-CM

## 2022-01-27 ENCOUNTER — MED REC SCAN ONLY (OUTPATIENT)
Dept: FAMILY MEDICINE CLINIC | Facility: CLINIC | Age: 47
End: 2022-01-27

## 2022-01-28 ENCOUNTER — TELEPHONE (OUTPATIENT)
Dept: GASTROENTEROLOGY | Facility: CLINIC | Age: 47
End: 2022-01-28

## 2022-01-28 DIAGNOSIS — Z12.11 COLON CANCER SCREENING: Primary | ICD-10-CM

## 2022-01-31 NOTE — TELEPHONE ENCOUNTER
Rescheduled for:  Colonoscopy [address]    Provider Name: From: Dr. Miroslava Duggan To: Dr Rolando Pearson  Date: From:3/22/2022 To: 2/15/2022  Location: From: [facility] To: [facility]  Sedation:  MAC  Time: From:9:30 To: 1:00pm (pt is aware that [facility] will call the day before to confirm arriv

## 2022-01-31 NOTE — TELEPHONE ENCOUNTER
Dr Elio Gilbert I rescheduled patient to 2/15/22 at the [place] [id number] from Dr Fernandez's schedule to yours. Patient was asking for a sooner date, she has never been seen by any of our GI providers. Is it ok to use Dr Fernandez's orders?     GI schedulers –     Please schedule c

## 2022-02-03 ENCOUNTER — MED REC SCAN ONLY (OUTPATIENT)
Dept: FAMILY MEDICINE CLINIC | Facility: CLINIC | Age: 47
End: 2022-02-03

## 2022-02-10 ENCOUNTER — OFFICE VISIT (OUTPATIENT)
Dept: SLEEP CENTER | Age: 47
End: 2022-02-10
Attending: FAMILY MEDICINE
Payer: COMMERCIAL

## 2022-02-10 DIAGNOSIS — R29.818 SUSPECTED SLEEP APNEA: ICD-10-CM

## 2022-02-10 DIAGNOSIS — F45.8 BRUXISM (TEETH GRINDING): ICD-10-CM

## 2022-02-10 DIAGNOSIS — R06.83 SNORING: ICD-10-CM

## 2022-02-10 PROCEDURE — 95806 SLEEP STUDY UNATT&RESP EFFT: CPT

## 2022-02-12 ENCOUNTER — LAB REQUISITION (OUTPATIENT)
Dept: SURGERY | Age: 47
End: 2022-02-12
Payer: COMMERCIAL

## 2022-02-13 LAB — SARS-COV-2 RNA RESP QL NAA+PROBE: NOT DETECTED

## 2022-02-15 ENCOUNTER — SURGERY CENTER DOCUMENTATION (OUTPATIENT)
Dept: SURGERY | Age: 47
End: 2022-02-15

## 2022-02-15 ENCOUNTER — LAB REQUISITION (OUTPATIENT)
Dept: SURGERY | Age: 47
End: 2022-02-15
Payer: COMMERCIAL

## 2022-02-15 PROCEDURE — 88305 TISSUE EXAM BY PATHOLOGIST: CPT | Performed by: INTERNAL MEDICINE

## 2022-02-16 ENCOUNTER — TELEPHONE (OUTPATIENT)
Dept: GASTROENTEROLOGY | Facility: CLINIC | Age: 47
End: 2022-02-16

## 2022-02-17 NOTE — PROCEDURES
[facility]  Accredited by the [place] of Sleep Medicine (AASM)    PATIENT'S NAME: Beau Norman   ATTENDING PHYSICIAN: Kelsey Bustamante DO   REFERRING PHYSICIAN:    PATIENT ACCOUNT #: [de-identified] LOCATION: [address] #: [id number] YOB: 1975   DATE OF STUDY: 02/10/2022       SLEEP STUDY REPORT    STUDY TYPE:  Unattended home sleep test.    INDICATION:  Suspected obstructive sleep apnea (ICD-10 code, G47.33) in a patient with snoring,  occasional unrefreshing sleep, an [place] Sleepiness Scale score of 13/24, and a body mass index of 31.8. RESULTS:  The patient underwent home sleep test with measurement of her nasal air flow, nasal air pressure, snoring, chest and abdominal wall motion, oximetry, and body position. I have reviewed the entirety of the raw data of this study. During this study, the total recording time was 445 minutes. The lights out clock time was 10:34 p.m., and lights on clock time was 5:59 a.m. There were no apneic events for an apnea index of 0 events per hour. There were 57 hypopneic events for a hypopnea index of 7.7 events per hour. The combined apnea plus hypopnea index is 7.7 events per hour. There was no significant hypoventilation, Cheyne-Cummings breathing, or periodic breathing. The average oxygen saturation is 97%. The lowest oxygen saturation is 87%. The average desaturation is 4%. The oxygen desaturation index is 8 events per hour. Snoring was appreciated. The patient spent 331 minutes in the supine position equivalent to 75% of the testing and 113 minutes in the non supine position equivalent to 25% of the testing. The average heart rate was 80 beats per minute. INTERPRETATION:  The data generated from this study is consistent with mild obstructive sleep apnea (ICD-10 code, G47.33). RECOMMENDATIONS:    1. Can consider CPAP titration. 2.   Weight loss. 3.   Avoid alcohol.   4.   Avoid sedating drug.  5.   Patient should not drive if at all sleepy. 6.   Patient should avoid the supine position as most of the events occurred in that position. Please do not hesitate to contact me if there is any question whatsoever regarding interpretation of this study. Dictated By Isaura Linares MD  d: 02/16/2022 15:32:36  t: 02/16/2022 16:15:56  Job [id number]  ZNN/    cc:  Mitzy Muñoz DO

## 2022-02-17 NOTE — TELEPHONE ENCOUNTER
----- Message from Samantha Patino MD sent at 2/16/2022  1:49 PM CST -----  GI staff: please place recall in for colonoscopy in 10 years

## 2022-02-17 NOTE — TELEPHONE ENCOUNTER
Entered into Epic. Recall CLN in 10 years per Dr. Elfego Rios. Last CLN done 02/15/2022. Recall entered into Patient Outreach for 02/15/2032. Health Maintenance updated.

## 2022-03-15 ENCOUNTER — ORDER TRANSCRIPTION (OUTPATIENT)
Dept: SLEEP CENTER | Age: 47
End: 2022-03-15

## 2022-04-13 ENCOUNTER — LAB ENCOUNTER (OUTPATIENT)
Dept: LAB | Facility: HOSPITAL | Age: 47
End: 2022-04-13
Attending: INTERNAL MEDICINE
Payer: COMMERCIAL

## 2022-04-13 DIAGNOSIS — G47.33 OBSTRUCTIVE SLEEP APNEA (ADULT) (PEDIATRIC): ICD-10-CM

## 2022-04-14 LAB — SARS-COV-2 RNA RESP QL NAA+PROBE: NOT DETECTED

## 2022-04-15 ENCOUNTER — TELEPHONE (OUTPATIENT)
Dept: FAMILY MEDICINE CLINIC | Facility: CLINIC | Age: 47
End: 2022-04-15

## 2022-04-15 NOTE — TELEPHONE ENCOUNTER
Patient is calling today as she was advised by her dentist that there is a device she can wear for sleep apnea. She has had one sleep study and is scheduled tomorrow for titration but she doesn't want to use a cpap machine. She is asking for Dr. Zaira Grant to help her and possibly change the order. Her dentist advised her to speak with her pcp. Advised there is really nothing I can do right now so I advised her to cancel the appointment for the sleep study titration scheduled for tomorrow and wait until her upcoming appt with Dr. Zaira Grant she has scheduled for 4/19/22. This way, she can get her pcp's advice.

## 2022-04-19 ENCOUNTER — LAB ENCOUNTER (OUTPATIENT)
Dept: LAB | Age: 47
End: 2022-04-19
Attending: FAMILY MEDICINE
Payer: COMMERCIAL

## 2022-04-19 ENCOUNTER — OFFICE VISIT (OUTPATIENT)
Dept: FAMILY MEDICINE CLINIC | Facility: CLINIC | Age: 47
End: 2022-04-19
Payer: COMMERCIAL

## 2022-04-19 VITALS
DIASTOLIC BLOOD PRESSURE: 72 MMHG | HEART RATE: 87 BPM | WEIGHT: 196 LBS | OXYGEN SATURATION: 97 % | HEIGHT: 63 IN | BODY MASS INDEX: 34.73 KG/M2 | SYSTOLIC BLOOD PRESSURE: 110 MMHG

## 2022-04-19 DIAGNOSIS — G47.33 OSA (OBSTRUCTIVE SLEEP APNEA): ICD-10-CM

## 2022-04-19 DIAGNOSIS — Z00.00 ROUTINE PHYSICAL EXAMINATION: ICD-10-CM

## 2022-04-19 DIAGNOSIS — Z82.61 FAMILY HISTORY OF RHEUMATOID ARTHRITIS: ICD-10-CM

## 2022-04-19 DIAGNOSIS — Z00.00 ROUTINE PHYSICAL EXAMINATION: Primary | ICD-10-CM

## 2022-04-19 DIAGNOSIS — J45.20 MILD INTERMITTENT ASTHMA WITHOUT COMPLICATION: ICD-10-CM

## 2022-04-19 LAB
ALBUMIN SERPL-MCNC: 3.5 G/DL (ref 3.4–5)
ALBUMIN/GLOB SERPL: 0.8 {RATIO} (ref 1–2)
ALP LIVER SERPL-CCNC: 54 U/L
ALT SERPL-CCNC: 26 U/L
ANION GAP SERPL CALC-SCNC: 5 MMOL/L (ref 0–18)
AST SERPL-CCNC: 20 U/L (ref 15–37)
BASOPHILS # BLD AUTO: 0.02 X10(3) UL (ref 0–0.2)
BASOPHILS NFR BLD AUTO: 0.4 %
BILIRUB SERPL-MCNC: 0.3 MG/DL (ref 0.1–2)
BILIRUB UR QL: NEGATIVE
BUN BLD-MCNC: 9 MG/DL (ref 7–18)
BUN/CREAT SERPL: 9.6 (ref 10–20)
CALCIUM BLD-MCNC: 8.8 MG/DL (ref 8.5–10.1)
CHLORIDE SERPL-SCNC: 108 MMOL/L (ref 98–112)
CHOLEST SERPL-MCNC: 167 MG/DL (ref ?–200)
CO2 SERPL-SCNC: 26 MMOL/L (ref 21–32)
COLOR UR: YELLOW
CREAT BLD-MCNC: 0.94 MG/DL
DEPRECATED RDW RBC AUTO: 44.4 FL (ref 35.1–46.3)
EOSINOPHIL # BLD AUTO: 0.15 X10(3) UL (ref 0–0.7)
EOSINOPHIL NFR BLD AUTO: 2.7 %
ERYTHROCYTE [DISTWIDTH] IN BLOOD BY AUTOMATED COUNT: 12.9 % (ref 11–15)
FASTING PATIENT LIPID ANSWER: YES
FASTING STATUS PATIENT QL REPORTED: YES
GLOBULIN PLAS-MCNC: 4.5 G/DL (ref 2.8–4.4)
GLUCOSE BLD-MCNC: 90 MG/DL (ref 70–99)
GLUCOSE UR-MCNC: NEGATIVE MG/DL
HCT VFR BLD AUTO: 41.5 %
HDLC SERPL-MCNC: 45 MG/DL (ref 40–59)
HGB BLD-MCNC: 13.2 G/DL
HGB UR QL STRIP.AUTO: NEGATIVE
IMM GRANULOCYTES # BLD AUTO: 0.02 X10(3) UL (ref 0–1)
IMM GRANULOCYTES NFR BLD: 0.4 %
KETONES UR-MCNC: NEGATIVE MG/DL
LDLC SERPL CALC-MCNC: 102 MG/DL (ref ?–100)
LYMPHOCYTES # BLD AUTO: 1.38 X10(3) UL (ref 1–4)
LYMPHOCYTES NFR BLD AUTO: 24.7 %
MCH RBC QN AUTO: 29.8 PG (ref 26–34)
MCHC RBC AUTO-ENTMCNC: 31.8 G/DL (ref 31–37)
MCV RBC AUTO: 93.7 FL
MONOCYTES # BLD AUTO: 0.67 X10(3) UL (ref 0.1–1)
MONOCYTES NFR BLD AUTO: 12 %
NEUTROPHILS # BLD AUTO: 3.35 X10 (3) UL (ref 1.5–7.7)
NEUTROPHILS # BLD AUTO: 3.35 X10(3) UL (ref 1.5–7.7)
NEUTROPHILS NFR BLD AUTO: 59.8 %
NITRITE UR QL STRIP.AUTO: NEGATIVE
NONHDLC SERPL-MCNC: 122 MG/DL (ref ?–130)
OSMOLALITY SERPL CALC.SUM OF ELEC: 286 MOSM/KG (ref 275–295)
PH UR: 6 [PH] (ref 5–8)
PLATELET # BLD AUTO: 270 10(3)UL (ref 150–450)
POTASSIUM SERPL-SCNC: 3.9 MMOL/L (ref 3.5–5.1)
PROT SERPL-MCNC: 8 G/DL (ref 6.4–8.2)
PROT UR-MCNC: NEGATIVE MG/DL
RBC # BLD AUTO: 4.43 X10(6)UL
SODIUM SERPL-SCNC: 139 MMOL/L (ref 136–145)
SP GR UR STRIP: 1.01 (ref 1–1.03)
TRIGL SERPL-MCNC: 108 MG/DL (ref 30–149)
TSI SER-ACNC: 1.2 MIU/ML (ref 0.36–3.74)
UROBILINOGEN UR STRIP-ACNC: <2
VIT C UR-MCNC: NEGATIVE MG/DL
VLDLC SERPL CALC-MCNC: 18 MG/DL (ref 0–30)
WBC # BLD AUTO: 5.6 X10(3) UL (ref 4–11)

## 2022-04-19 PROCEDURE — 84443 ASSAY THYROID STIM HORMONE: CPT

## 2022-04-19 PROCEDURE — 80061 LIPID PANEL: CPT

## 2022-04-19 PROCEDURE — 99396 PREV VISIT EST AGE 40-64: CPT | Performed by: FAMILY MEDICINE

## 2022-04-19 PROCEDURE — 3078F DIAST BP <80 MM HG: CPT | Performed by: FAMILY MEDICINE

## 2022-04-19 PROCEDURE — 80053 COMPREHEN METABOLIC PANEL: CPT

## 2022-04-19 PROCEDURE — 3074F SYST BP LT 130 MM HG: CPT | Performed by: FAMILY MEDICINE

## 2022-04-19 PROCEDURE — 81001 URINALYSIS AUTO W/SCOPE: CPT

## 2022-04-19 PROCEDURE — 85025 COMPLETE CBC W/AUTO DIFF WBC: CPT

## 2022-04-19 PROCEDURE — 3008F BODY MASS INDEX DOCD: CPT | Performed by: FAMILY MEDICINE

## 2022-04-19 PROCEDURE — 36415 COLL VENOUS BLD VENIPUNCTURE: CPT

## 2022-04-19 PROCEDURE — 93000 ELECTROCARDIOGRAM COMPLETE: CPT | Performed by: FAMILY MEDICINE

## 2022-04-19 RX ORDER — VITAMIN B COMPLEX
TABLET ORAL AS DIRECTED
COMMUNITY

## 2022-04-19 RX ORDER — UBIDECARENONE/VITAMIN E MIXED 100 MG-100
CAPSULE ORAL
COMMUNITY

## 2022-04-19 RX ORDER — PRENATAL VIT/IRON FUM/FOLIC AC 27MG-0.8MG
1 TABLET ORAL DAILY
COMMUNITY

## 2022-04-19 NOTE — PATIENT INSTRUCTIONS
All adult screening ordered and done appropriate for patient's age and gender and risk factors and complaints. Medication reviewed and renewed where needed and appropriate. Comply with medications. Monitor blood pressures and record at home. Limit salt intake. Encouraged physical fitness and daily physical activity daily. Recommend weight loss via daily exercising and consistent healthy dietary changes.

## 2022-05-03 ENCOUNTER — OFFICE VISIT (OUTPATIENT)
Dept: PULMONOLOGY | Facility: CLINIC | Age: 47
End: 2022-05-03
Payer: COMMERCIAL

## 2022-05-03 VITALS
OXYGEN SATURATION: 100 % | WEIGHT: 196 LBS | HEART RATE: 83 BPM | RESPIRATION RATE: 16 BRPM | DIASTOLIC BLOOD PRESSURE: 75 MMHG | SYSTOLIC BLOOD PRESSURE: 118 MMHG | HEIGHT: 63 IN | BODY MASS INDEX: 34.73 KG/M2

## 2022-05-03 DIAGNOSIS — G47.33 OSA (OBSTRUCTIVE SLEEP APNEA): Primary | ICD-10-CM

## 2022-05-03 PROCEDURE — 3078F DIAST BP <80 MM HG: CPT | Performed by: INTERNAL MEDICINE

## 2022-05-03 PROCEDURE — 3008F BODY MASS INDEX DOCD: CPT | Performed by: INTERNAL MEDICINE

## 2022-05-03 PROCEDURE — 99243 OFF/OP CNSLTJ NEW/EST LOW 30: CPT | Performed by: INTERNAL MEDICINE

## 2022-05-03 PROCEDURE — 3074F SYST BP LT 130 MM HG: CPT | Performed by: INTERNAL MEDICINE

## 2022-05-03 NOTE — PROGRESS NOTES
Patient wanted to know what is the better sleeping position for sleep apnea. Per Dr. Darya Bailon, side line position is recommended. Patient can buy a body pillow for support. Called patient and notified.

## 2022-07-15 ENCOUNTER — HOSPITAL ENCOUNTER (OUTPATIENT)
Age: 47
Discharge: HOME | End: 2022-07-15
Payer: COMMERCIAL

## 2022-07-15 VITALS
RESPIRATION RATE: 20 BRPM | BODY MASS INDEX: 34 KG/M2 | HEART RATE: 85 BPM | TEMPERATURE: 97 F | DIASTOLIC BLOOD PRESSURE: 70 MMHG | HEIGHT: 64 IN | OXYGEN SATURATION: 100 % | SYSTOLIC BLOOD PRESSURE: 125 MMHG

## 2022-07-15 DIAGNOSIS — U07.1 COVID-19: Primary | ICD-10-CM

## 2022-07-15 LAB — SARS-COV-2 RNA RESP QL NAA+PROBE: DETECTED

## 2022-07-15 PROCEDURE — U0002 COVID-19 LAB TEST NON-CDC: HCPCS | Performed by: NURSE PRACTITIONER

## 2022-07-15 PROCEDURE — 99213 OFFICE O/P EST LOW 20 MIN: CPT | Performed by: NURSE PRACTITIONER

## 2022-07-15 RX ORDER — DEXAMETHASONE 0.5 MG/1
0.5 TABLET ORAL DAILY
COMMUNITY
Start: 2022-05-23

## 2022-07-15 RX ORDER — NIRMATRELVIR AND RITONAVIR 300-100 MG
KIT ORAL
Qty: 30 TABLET | Refills: 0 | Status: SHIPPED | OUTPATIENT
Start: 2022-07-15 | End: 2022-07-20

## 2022-07-15 NOTE — ED INITIAL ASSESSMENT (HPI)
Pt states having some cough and congestion that she's been dealing with since last week. Pt states took a covid test Wednesday and was neg. Pt states took another one at home today. and tested pos. Pt states just recently having her second covid booster.

## 2022-10-10 ENCOUNTER — PATIENT MESSAGE (OUTPATIENT)
Dept: FAMILY MEDICINE CLINIC | Facility: CLINIC | Age: 47
End: 2022-10-10

## 2022-10-12 DIAGNOSIS — R60.0 FLUID RETENTION IN LEGS: ICD-10-CM

## 2022-10-12 DIAGNOSIS — R60.0 FLUID RETENTION IN LEGS: Primary | ICD-10-CM

## 2022-10-12 RX ORDER — POTASSIUM CHLORIDE 750 MG/1
10 TABLET, FILM COATED, EXTENDED RELEASE ORAL DAILY
Qty: 30 TABLET | Refills: 0 | Status: SHIPPED | OUTPATIENT
Start: 2022-10-12

## 2022-10-12 RX ORDER — FUROSEMIDE 20 MG/1
20 TABLET ORAL DAILY
Qty: 30 TABLET | Refills: 0 | Status: SHIPPED | OUTPATIENT
Start: 2022-10-12

## 2022-10-13 RX ORDER — FUROSEMIDE 20 MG/1
TABLET ORAL
Qty: 90 TABLET | Refills: 0 | OUTPATIENT
Start: 2022-10-13

## 2022-10-13 RX ORDER — POTASSIUM CHLORIDE 750 MG/1
TABLET, FILM COATED, EXTENDED RELEASE ORAL
Qty: 90 TABLET | Refills: 0 | OUTPATIENT
Start: 2022-10-13

## 2022-10-18 ENCOUNTER — NURSE ONLY (OUTPATIENT)
Dept: FAMILY MEDICINE CLINIC | Facility: CLINIC | Age: 47
End: 2022-10-18
Payer: COMMERCIAL

## 2022-10-18 DIAGNOSIS — Z23 NEED FOR VACCINATION: Primary | ICD-10-CM

## 2022-10-18 PROCEDURE — 90471 IMMUNIZATION ADMIN: CPT | Performed by: FAMILY MEDICINE

## 2022-10-18 PROCEDURE — 90686 IIV4 VACC NO PRSV 0.5 ML IM: CPT | Performed by: FAMILY MEDICINE

## 2022-10-26 ENCOUNTER — OFFICE VISIT (OUTPATIENT)
Dept: FAMILY MEDICINE CLINIC | Facility: CLINIC | Age: 47
End: 2022-10-26
Payer: COMMERCIAL

## 2022-10-26 ENCOUNTER — LAB ENCOUNTER (OUTPATIENT)
Dept: LAB | Age: 47
End: 2022-10-26
Attending: FAMILY MEDICINE
Payer: COMMERCIAL

## 2022-10-26 VITALS
WEIGHT: 209.25 LBS | TEMPERATURE: 97 F | BODY MASS INDEX: 35.72 KG/M2 | HEIGHT: 64 IN | SYSTOLIC BLOOD PRESSURE: 103 MMHG | DIASTOLIC BLOOD PRESSURE: 69 MMHG | HEART RATE: 89 BPM

## 2022-10-26 DIAGNOSIS — R94.31 ABNORMAL FINDING ON EKG: ICD-10-CM

## 2022-10-26 DIAGNOSIS — J45.20 MILD INTERMITTENT ASTHMA WITHOUT COMPLICATION: ICD-10-CM

## 2022-10-26 DIAGNOSIS — R60.0 BILATERAL LEG EDEMA: Primary | ICD-10-CM

## 2022-10-26 DIAGNOSIS — G47.33 OSA (OBSTRUCTIVE SLEEP APNEA): ICD-10-CM

## 2022-10-26 DIAGNOSIS — R60.0 BILATERAL LEG EDEMA: ICD-10-CM

## 2022-10-26 LAB
ANION GAP SERPL CALC-SCNC: 6 MMOL/L (ref 0–18)
BASOPHILS # BLD AUTO: 0.03 X10(3) UL (ref 0–0.2)
BASOPHILS NFR BLD AUTO: 0.7 %
BUN BLD-MCNC: 9 MG/DL (ref 7–18)
BUN/CREAT SERPL: 8.3 (ref 10–20)
CALCIUM BLD-MCNC: 8.7 MG/DL (ref 8.5–10.1)
CHLORIDE SERPL-SCNC: 108 MMOL/L (ref 98–112)
CO2 SERPL-SCNC: 24 MMOL/L (ref 21–32)
CREAT BLD-MCNC: 1.08 MG/DL
DEPRECATED RDW RBC AUTO: 43.8 FL (ref 35.1–46.3)
EOSINOPHIL # BLD AUTO: 0.15 X10(3) UL (ref 0–0.7)
EOSINOPHIL NFR BLD AUTO: 3.4 %
ERYTHROCYTE [DISTWIDTH] IN BLOOD BY AUTOMATED COUNT: 12.7 % (ref 11–15)
FASTING STATUS PATIENT QL REPORTED: YES
GFR SERPLBLD BASED ON 1.73 SQ M-ARVRAT: 64 ML/MIN/1.73M2 (ref 60–?)
GLUCOSE BLD-MCNC: 99 MG/DL (ref 70–99)
HCT VFR BLD AUTO: 43.7 %
HGB BLD-MCNC: 13.8 G/DL
IMM GRANULOCYTES # BLD AUTO: 0.02 X10(3) UL (ref 0–1)
IMM GRANULOCYTES NFR BLD: 0.5 %
LYMPHOCYTES # BLD AUTO: 1.74 X10(3) UL (ref 1–4)
LYMPHOCYTES NFR BLD AUTO: 39.6 %
MCH RBC QN AUTO: 29.9 PG (ref 26–34)
MCHC RBC AUTO-ENTMCNC: 31.6 G/DL (ref 31–37)
MCV RBC AUTO: 94.6 FL
MONOCYTES # BLD AUTO: 0.67 X10(3) UL (ref 0.1–1)
MONOCYTES NFR BLD AUTO: 15.3 %
NEUTROPHILS # BLD AUTO: 1.78 X10 (3) UL (ref 1.5–7.7)
NEUTROPHILS # BLD AUTO: 1.78 X10(3) UL (ref 1.5–7.7)
NEUTROPHILS NFR BLD AUTO: 40.5 %
OSMOLALITY SERPL CALC.SUM OF ELEC: 285 MOSM/KG (ref 275–295)
PLATELET # BLD AUTO: 250 10(3)UL (ref 150–450)
POTASSIUM SERPL-SCNC: 4 MMOL/L (ref 3.5–5.1)
RBC # BLD AUTO: 4.62 X10(6)UL
SODIUM SERPL-SCNC: 138 MMOL/L (ref 136–145)
WBC # BLD AUTO: 4.4 X10(3) UL (ref 4–11)

## 2022-10-26 PROCEDURE — 36415 COLL VENOUS BLD VENIPUNCTURE: CPT

## 2022-10-26 PROCEDURE — 3074F SYST BP LT 130 MM HG: CPT | Performed by: FAMILY MEDICINE

## 2022-10-26 PROCEDURE — 85025 COMPLETE CBC W/AUTO DIFF WBC: CPT

## 2022-10-26 PROCEDURE — 3008F BODY MASS INDEX DOCD: CPT | Performed by: FAMILY MEDICINE

## 2022-10-26 PROCEDURE — 3078F DIAST BP <80 MM HG: CPT | Performed by: FAMILY MEDICINE

## 2022-10-26 PROCEDURE — 99214 OFFICE O/P EST MOD 30 MIN: CPT | Performed by: FAMILY MEDICINE

## 2022-10-26 PROCEDURE — 80048 BASIC METABOLIC PNL TOTAL CA: CPT

## 2022-10-26 RX ORDER — VITAMIN E 268 MG
1000 CAPSULE ORAL DAILY
COMMUNITY

## 2022-10-26 RX ORDER — CHLORAL HYDRATE 500 MG
CAPSULE ORAL
COMMUNITY

## 2022-10-26 RX ORDER — ASPIRIN 81 MG/1
81 TABLET ORAL DAILY
COMMUNITY

## 2022-10-26 NOTE — PATIENT INSTRUCTIONS
Consider pulmonary function test for status on patient's known asthma. Will get echocardiogram so that we can determine status of the left ventricle and also pulmonary artery pressure. CBC and BMP ordered to check renal status and make sure that patient is not anemic. Continue with oral appliance compliance regarding mild sleep apnea. Continue with current diuretic recommendations for now.

## 2022-10-29 ENCOUNTER — HOSPITAL ENCOUNTER (OUTPATIENT)
Dept: CV DIAGNOSTICS | Facility: HOSPITAL | Age: 47
Discharge: HOME | End: 2022-10-29
Attending: FAMILY MEDICINE
Payer: COMMERCIAL

## 2022-10-29 DIAGNOSIS — R60.0 BILATERAL LEG EDEMA: ICD-10-CM

## 2022-10-29 DIAGNOSIS — R94.31 ABNORMAL FINDING ON EKG: ICD-10-CM

## 2022-10-29 PROCEDURE — 93306 TTE W/DOPPLER COMPLETE: CPT | Performed by: FAMILY MEDICINE

## 2022-11-04 ENCOUNTER — PATIENT MESSAGE (OUTPATIENT)
Dept: FAMILY MEDICINE CLINIC | Facility: CLINIC | Age: 47
End: 2022-11-04

## 2022-11-04 DIAGNOSIS — R60.0 FLUID RETENTION IN LEGS: ICD-10-CM

## 2022-11-05 DIAGNOSIS — R60.0 FLUID RETENTION IN LEGS: ICD-10-CM

## 2022-11-05 RX ORDER — POTASSIUM CHLORIDE 750 MG/1
10 TABLET, FILM COATED, EXTENDED RELEASE ORAL DAILY
Qty: 30 TABLET | Refills: 0 | Status: SHIPPED | OUTPATIENT
Start: 2022-11-05

## 2022-11-05 RX ORDER — FUROSEMIDE 20 MG/1
20 TABLET ORAL DAILY
Qty: 30 TABLET | Refills: 0 | Status: SHIPPED | OUTPATIENT
Start: 2022-11-05

## 2022-11-08 RX ORDER — FUROSEMIDE 20 MG/1
TABLET ORAL
Qty: 90 TABLET | Refills: 0 | Status: SHIPPED | OUTPATIENT
Start: 2022-11-08

## 2022-12-08 ENCOUNTER — LAB ENCOUNTER (OUTPATIENT)
Dept: LAB | Age: 47
End: 2022-12-08
Attending: FAMILY MEDICINE
Payer: COMMERCIAL

## 2022-12-08 ENCOUNTER — OFFICE VISIT (OUTPATIENT)
Dept: FAMILY MEDICINE CLINIC | Facility: CLINIC | Age: 47
End: 2022-12-08
Payer: COMMERCIAL

## 2022-12-08 VITALS
SYSTOLIC BLOOD PRESSURE: 106 MMHG | TEMPERATURE: 98 F | HEART RATE: 99 BPM | WEIGHT: 210.5 LBS | BODY MASS INDEX: 35.94 KG/M2 | DIASTOLIC BLOOD PRESSURE: 78 MMHG | HEIGHT: 64 IN

## 2022-12-08 DIAGNOSIS — Z01.419 ENCOUNTER FOR CERVICAL PAP SMEAR WITH PELVIC EXAM: ICD-10-CM

## 2022-12-08 DIAGNOSIS — Z23 NEED FOR DIPHTHERIA-TETANUS-PERTUSSIS (TDAP) VACCINE: ICD-10-CM

## 2022-12-08 DIAGNOSIS — T78.3XXS ANGIONEUROTIC EDEMA, SEQUELA: ICD-10-CM

## 2022-12-08 DIAGNOSIS — Z01.419 ENCOUNTER FOR CERVICAL PAP SMEAR WITH PELVIC EXAM: Primary | ICD-10-CM

## 2022-12-08 DIAGNOSIS — R60.0 BILATERAL LEG EDEMA: ICD-10-CM

## 2022-12-08 DIAGNOSIS — Z12.31 ENCOUNTER FOR SCREENING MAMMOGRAM FOR BREAST CANCER: ICD-10-CM

## 2022-12-08 DIAGNOSIS — R73.03 PREDIABETES: ICD-10-CM

## 2022-12-08 DIAGNOSIS — R76.8 POSITIVE ANA (ANTINUCLEAR ANTIBODY): ICD-10-CM

## 2022-12-08 LAB
ALBUMIN SERPL-MCNC: 3.9 G/DL (ref 3.4–5)
ALP LIVER SERPL-CCNC: 59 U/L
ALT SERPL-CCNC: 32 U/L
ANION GAP SERPL CALC-SCNC: 5 MMOL/L (ref 0–18)
AST SERPL-CCNC: 26 U/L (ref 15–37)
BASOPHILS # BLD AUTO: 0.03 X10(3) UL (ref 0–0.2)
BASOPHILS NFR BLD AUTO: 0.6 %
BILIRUB DIRECT SERPL-MCNC: <0.1 MG/DL (ref 0–0.2)
BILIRUB SERPL-MCNC: 0.4 MG/DL (ref 0.1–2)
BUN BLD-MCNC: 10 MG/DL (ref 7–18)
BUN/CREAT SERPL: 10.4 (ref 10–20)
CALCIUM BLD-MCNC: 9.2 MG/DL (ref 8.5–10.1)
CHLORIDE SERPL-SCNC: 105 MMOL/L (ref 98–112)
CO2 SERPL-SCNC: 29 MMOL/L (ref 21–32)
CREAT BLD-MCNC: 0.96 MG/DL
CRP SERPL-MCNC: 1.09 MG/DL (ref ?–0.3)
DEPRECATED RDW RBC AUTO: 43 FL (ref 35.1–46.3)
EOSINOPHIL # BLD AUTO: 0.17 X10(3) UL (ref 0–0.7)
EOSINOPHIL NFR BLD AUTO: 3.5 %
ERYTHROCYTE [DISTWIDTH] IN BLOOD BY AUTOMATED COUNT: 12.4 % (ref 11–15)
ERYTHROCYTE [SEDIMENTATION RATE] IN BLOOD: 40 MM/HR
FASTING STATUS PATIENT QL REPORTED: NO
GFR SERPLBLD BASED ON 1.73 SQ M-ARVRAT: 73 ML/MIN/1.73M2 (ref 60–?)
GLUCOSE BLD-MCNC: 90 MG/DL (ref 70–99)
HCT VFR BLD AUTO: 42.5 %
HGB BLD-MCNC: 13.4 G/DL
IMM GRANULOCYTES # BLD AUTO: 0 X10(3) UL (ref 0–1)
IMM GRANULOCYTES NFR BLD: 0 %
LYMPHOCYTES # BLD AUTO: 1.36 X10(3) UL (ref 1–4)
LYMPHOCYTES NFR BLD AUTO: 28.3 %
MCH RBC QN AUTO: 29.7 PG (ref 26–34)
MCHC RBC AUTO-ENTMCNC: 31.5 G/DL (ref 31–37)
MCV RBC AUTO: 94.2 FL
MONOCYTES # BLD AUTO: 0.53 X10(3) UL (ref 0.1–1)
MONOCYTES NFR BLD AUTO: 11 %
NEUTROPHILS # BLD AUTO: 2.71 X10 (3) UL (ref 1.5–7.7)
NEUTROPHILS # BLD AUTO: 2.71 X10(3) UL (ref 1.5–7.7)
NEUTROPHILS NFR BLD AUTO: 56.6 %
OSMOLALITY SERPL CALC.SUM OF ELEC: 287 MOSM/KG (ref 275–295)
PLATELET # BLD AUTO: 319 10(3)UL (ref 150–450)
POTASSIUM SERPL-SCNC: 3.5 MMOL/L (ref 3.5–5.1)
PROT SERPL-MCNC: 8 G/DL (ref 6.4–8.2)
RBC # BLD AUTO: 4.51 X10(6)UL
SODIUM SERPL-SCNC: 139 MMOL/L (ref 136–145)
WBC # BLD AUTO: 4.8 X10(3) UL (ref 4–11)

## 2022-12-08 PROCEDURE — 86225 DNA ANTIBODY NATIVE: CPT

## 2022-12-08 PROCEDURE — 85025 COMPLETE CBC W/AUTO DIFF WBC: CPT

## 2022-12-08 PROCEDURE — 86036 ANCA SCREEN EACH ANTIBODY: CPT

## 2022-12-08 PROCEDURE — 85652 RBC SED RATE AUTOMATED: CPT

## 2022-12-08 PROCEDURE — 86038 ANTINUCLEAR ANTIBODIES: CPT

## 2022-12-08 PROCEDURE — 80076 HEPATIC FUNCTION PANEL: CPT

## 2022-12-08 PROCEDURE — 86160 COMPLEMENT ANTIGEN: CPT

## 2022-12-08 PROCEDURE — 86162 COMPLEMENT TOTAL (CH50): CPT

## 2022-12-08 PROCEDURE — 3078F DIAST BP <80 MM HG: CPT | Performed by: FAMILY MEDICINE

## 2022-12-08 PROCEDURE — 3008F BODY MASS INDEX DOCD: CPT | Performed by: FAMILY MEDICINE

## 2022-12-08 PROCEDURE — 99214 OFFICE O/P EST MOD 30 MIN: CPT | Performed by: FAMILY MEDICINE

## 2022-12-08 PROCEDURE — 83520 IMMUNOASSAY QUANT NOS NONAB: CPT

## 2022-12-08 PROCEDURE — 83516 IMMUNOASSAY NONANTIBODY: CPT

## 2022-12-08 PROCEDURE — 90471 IMMUNIZATION ADMIN: CPT | Performed by: FAMILY MEDICINE

## 2022-12-08 PROCEDURE — 90715 TDAP VACCINE 7 YRS/> IM: CPT | Performed by: FAMILY MEDICINE

## 2022-12-08 PROCEDURE — 36415 COLL VENOUS BLD VENIPUNCTURE: CPT

## 2022-12-08 PROCEDURE — 80048 BASIC METABOLIC PNL TOTAL CA: CPT

## 2022-12-08 PROCEDURE — 3074F SYST BP LT 130 MM HG: CPT | Performed by: FAMILY MEDICINE

## 2022-12-08 PROCEDURE — 86140 C-REACTIVE PROTEIN: CPT

## 2022-12-08 NOTE — PATIENT INSTRUCTIONS
Labs have been ordered according to subspecialty request.  Patient referred to hematology and also endocrinology. Support stockings may be of some benefit. I am suggesting that the patient discontinue the furosemide and potassium for 1 week so that we might be able to see whether it makes any clinical difference at all.

## 2022-12-09 LAB
C3 SERPL-MCNC: 163 MG/DL (ref 90–180)
C4 SERPL-MCNC: 18.3 MG/DL (ref 10–40)

## 2022-12-11 LAB
MYELOPEROX ANTIBODIES, IGG: 0 AU/ML
SERINE PROTEASE 3, IGG: 3 AU/ML

## 2022-12-12 LAB
COMPLEMENT ACTIVITY, TOTAL EIA: 52.2 U/ML
DSDNA IGG SERPL IA-ACNC: 0.9 IU/ML
ENA AB SER QL IA: 54 UG/L
ENA AB SER QL IA: POSITIVE
TRYPTASE: 4.5 UG/L

## 2022-12-20 DIAGNOSIS — R60.0 FLUID RETENTION IN LEGS: ICD-10-CM

## 2022-12-21 ENCOUNTER — OFFICE VISIT (OUTPATIENT)
Dept: ENDOCRINOLOGY CLINIC | Facility: CLINIC | Age: 47
End: 2022-12-21
Payer: COMMERCIAL

## 2022-12-21 VITALS
HEART RATE: 93 BPM | WEIGHT: 205 LBS | DIASTOLIC BLOOD PRESSURE: 76 MMHG | BODY MASS INDEX: 35 KG/M2 | SYSTOLIC BLOOD PRESSURE: 111 MMHG

## 2022-12-21 DIAGNOSIS — R60.0 FLUID RETENTION IN LEGS: ICD-10-CM

## 2022-12-21 DIAGNOSIS — R73.03 PREDIABETES: Primary | ICD-10-CM

## 2022-12-21 LAB
CARTRIDGE LOT#: ABNORMAL NUMERIC
GLUCOSE BLOOD: 115
HEMOGLOBIN A1C: 5.9 % (ref 4.3–5.6)
TEST STRIP LOT #: NORMAL NUMERIC

## 2022-12-21 PROCEDURE — 83036 HEMOGLOBIN GLYCOSYLATED A1C: CPT

## 2022-12-21 PROCEDURE — 82947 ASSAY GLUCOSE BLOOD QUANT: CPT

## 2022-12-21 PROCEDURE — 3074F SYST BP LT 130 MM HG: CPT

## 2022-12-21 PROCEDURE — 99243 OFF/OP CNSLTJ NEW/EST LOW 30: CPT

## 2022-12-21 PROCEDURE — 3078F DIAST BP <80 MM HG: CPT

## 2022-12-21 RX ORDER — POTASSIUM CHLORIDE 750 MG/1
TABLET, FILM COATED, EXTENDED RELEASE ORAL
Qty: 30 TABLET | Refills: 0 | Status: SHIPPED | OUTPATIENT
Start: 2022-12-21

## 2022-12-21 RX ORDER — POTASSIUM CHLORIDE 750 MG/1
10 TABLET, FILM COATED, EXTENDED RELEASE ORAL DAILY
Qty: 30 TABLET | Refills: 0 | Status: SHIPPED | OUTPATIENT
Start: 2022-12-21 | End: 2022-12-21

## 2022-12-21 RX ORDER — FUROSEMIDE 20 MG/1
20 TABLET ORAL EVERY MORNING
Qty: 90 TABLET | Refills: 0 | Status: SHIPPED | OUTPATIENT
Start: 2022-12-21

## 2022-12-21 RX ORDER — POTASSIUM CHLORIDE 750 MG/1
TABLET, EXTENDED RELEASE ORAL
Qty: 90 TABLET | Refills: 0 | Status: SHIPPED | OUTPATIENT
Start: 2022-12-21

## 2022-12-29 ENCOUNTER — OFFICE VISIT (OUTPATIENT)
Dept: HEMATOLOGY/ONCOLOGY | Facility: HOSPITAL | Age: 47
End: 2022-12-29
Attending: INTERNAL MEDICINE
Payer: COMMERCIAL

## 2022-12-29 VITALS
RESPIRATION RATE: 16 BRPM | SYSTOLIC BLOOD PRESSURE: 124 MMHG | TEMPERATURE: 99 F | BODY MASS INDEX: 33.97 KG/M2 | DIASTOLIC BLOOD PRESSURE: 50 MMHG | HEART RATE: 90 BPM | OXYGEN SATURATION: 97 % | WEIGHT: 199 LBS | HEIGHT: 64 IN

## 2022-12-29 DIAGNOSIS — Z12.31 ENCOUNTER FOR SCREENING MAMMOGRAM FOR MALIGNANT NEOPLASM OF BREAST: Primary | ICD-10-CM

## 2022-12-29 DIAGNOSIS — R76.8 HIGH TOTAL IGG: Primary | ICD-10-CM

## 2022-12-29 PROCEDURE — 99244 OFF/OP CNSLTJ NEW/EST MOD 40: CPT | Performed by: INTERNAL MEDICINE

## 2022-12-29 RX ORDER — CRANBERRY FRUIT EXTRACT 650 MG
CAPSULE ORAL
COMMUNITY

## 2022-12-30 ENCOUNTER — LAB ENCOUNTER (OUTPATIENT)
Dept: LAB | Facility: HOSPITAL | Age: 47
End: 2022-12-30
Attending: INTERNAL MEDICINE
Payer: COMMERCIAL

## 2022-12-30 ENCOUNTER — HOSPITAL ENCOUNTER (OUTPATIENT)
Dept: GENERAL RADIOLOGY | Facility: HOSPITAL | Age: 47
Discharge: HOME | End: 2022-12-30
Attending: INTERNAL MEDICINE
Payer: COMMERCIAL

## 2022-12-30 DIAGNOSIS — R76.8 HIGH TOTAL IGG: ICD-10-CM

## 2022-12-30 DIAGNOSIS — D25.9 FIBROID, UTERINE: ICD-10-CM

## 2022-12-30 DIAGNOSIS — D72.828 OTHER ELEVATED WHITE BLOOD CELL COUNT: ICD-10-CM

## 2022-12-30 DIAGNOSIS — R76.0 ELEVATED ANTIBODY LEVELS: ICD-10-CM

## 2022-12-30 DIAGNOSIS — E03.9 PRIMARY HYPOTHYROIDISM: ICD-10-CM

## 2022-12-30 DIAGNOSIS — N98.3: ICD-10-CM

## 2022-12-30 DIAGNOSIS — N84.0 ENDOMETRIAL POLYP: ICD-10-CM

## 2022-12-30 DIAGNOSIS — E06.3 CHRONIC LYMPHOCYTIC THYROIDITIS: Primary | ICD-10-CM

## 2022-12-30 DIAGNOSIS — D72.9 WHITE BLOOD CELL DISORDER: ICD-10-CM

## 2022-12-30 DIAGNOSIS — E72.12 TETRAHYDROFOLATE METHYLTRANSFERASE DEFICIENCY (HCC): ICD-10-CM

## 2022-12-30 DIAGNOSIS — D68.61 PRIMARY ANTIPHOSPHOLIPID SYNDROME (HCC): ICD-10-CM

## 2022-12-30 DIAGNOSIS — R73.03 BORDERLINE DIABETES: ICD-10-CM

## 2022-12-30 DIAGNOSIS — M35.00 SJOGREN'S DISEASE (HCC): ICD-10-CM

## 2022-12-30 DIAGNOSIS — D89.89 SECONDARY IMMUNE DEFICIENCY DISORDER (HCC): ICD-10-CM

## 2022-12-30 DIAGNOSIS — D80.5 IMMUNODEFICIENCY WITH INCREASED IGM (HCC): ICD-10-CM

## 2022-12-30 DIAGNOSIS — N71.1: ICD-10-CM

## 2022-12-30 DIAGNOSIS — J45.20 INTRINSIC ASTHMA WITHOUT STATUS ASTHMATICUS: ICD-10-CM

## 2022-12-30 LAB
ALBUMIN SERPL-MCNC: 3.5 G/DL (ref 3.4–5)
ALBUMIN SERPL-MCNC: 3.6 G/DL (ref 3.4–5)
ALBUMIN/GLOB SERPL: 0.8 {RATIO} (ref 1–2)
ALBUMIN/GLOB SERPL: 0.8 {RATIO} (ref 1–2)
ALP LIVER SERPL-CCNC: 52 U/L
ALP LIVER SERPL-CCNC: 55 U/L
ALT SERPL-CCNC: 26 U/L
ALT SERPL-CCNC: 27 U/L
ANION GAP SERPL CALC-SCNC: 5 MMOL/L (ref 0–18)
ANION GAP SERPL CALC-SCNC: 5 MMOL/L (ref 0–18)
APTT PPP: 30.3 SECONDS (ref 23.3–35.6)
AST SERPL-CCNC: 19 U/L (ref 15–37)
AST SERPL-CCNC: 21 U/L (ref 15–37)
BASOPHILS # BLD AUTO: 0.04 X10(3) UL (ref 0–0.2)
BASOPHILS NFR BLD AUTO: 1 %
BILIRUB SERPL-MCNC: 0.3 MG/DL (ref 0.1–2)
BILIRUB SERPL-MCNC: 0.3 MG/DL (ref 0.1–2)
BUN BLD-MCNC: 12 MG/DL (ref 7–18)
BUN BLD-MCNC: 12 MG/DL (ref 7–18)
BUN/CREAT SERPL: 12 (ref 10–20)
BUN/CREAT SERPL: 12.1 (ref 10–20)
CALCIUM BLD-MCNC: 8.7 MG/DL (ref 8.5–10.1)
CALCIUM BLD-MCNC: 8.8 MG/DL (ref 8.5–10.1)
CHLORIDE SERPL-SCNC: 107 MMOL/L (ref 98–112)
CHLORIDE SERPL-SCNC: 108 MMOL/L (ref 98–112)
CO2 SERPL-SCNC: 27 MMOL/L (ref 21–32)
CO2 SERPL-SCNC: 28 MMOL/L (ref 21–32)
CREAT BLD-MCNC: 0.99 MG/DL
CREAT BLD-MCNC: 1 MG/DL
DEPRECATED RDW RBC AUTO: 43.4 FL (ref 35.1–46.3)
DHEA-S SERPL-MCNC: 46.5 UG/DL
EOSINOPHIL # BLD AUTO: 0.11 X10(3) UL (ref 0–0.7)
EOSINOPHIL NFR BLD AUTO: 2.7 %
ERYTHROCYTE [DISTWIDTH] IN BLOOD BY AUTOMATED COUNT: 12.7 % (ref 11–15)
FASTING STATUS PATIENT QL REPORTED: YES
FASTING STATUS PATIENT QL REPORTED: YES
GFR SERPLBLD BASED ON 1.73 SQ M-ARVRAT: 70 ML/MIN/1.73M2 (ref 60–?)
GFR SERPLBLD BASED ON 1.73 SQ M-ARVRAT: 71 ML/MIN/1.73M2 (ref 60–?)
GLOBULIN PLAS-MCNC: 4.5 G/DL (ref 2.8–4.4)
GLOBULIN PLAS-MCNC: 4.6 G/DL (ref 2.8–4.4)
GLUCOSE BLD-MCNC: 91 MG/DL (ref 70–99)
GLUCOSE BLD-MCNC: 92 MG/DL (ref 70–99)
HCT VFR BLD AUTO: 43.3 %
HGB BLD-MCNC: 13.9 G/DL
IGA SERPL-MCNC: <7.83 MG/DL (ref 70–312)
IGM SERPL-MCNC: 175 MG/DL (ref 43–279)
IMM GRANULOCYTES # BLD AUTO: 0.01 X10(3) UL (ref 0–1)
IMM GRANULOCYTES NFR BLD: 0.2 %
IMMUNOGLOBULIN PNL SER-MCNC: 2170 MG/DL (ref 791–1643)
LYMPHOCYTES # BLD AUTO: 1.54 X10(3) UL (ref 1–4)
LYMPHOCYTES NFR BLD AUTO: 38.4 %
MCH RBC QN AUTO: 29.6 PG (ref 26–34)
MCHC RBC AUTO-ENTMCNC: 32.1 G/DL (ref 31–37)
MCV RBC AUTO: 92.1 FL
MONOCYTES # BLD AUTO: 0.41 X10(3) UL (ref 0.1–1)
MONOCYTES NFR BLD AUTO: 10.2 %
NEUTROPHILS # BLD AUTO: 1.9 X10 (3) UL (ref 1.5–7.7)
NEUTROPHILS # BLD AUTO: 1.9 X10(3) UL (ref 1.5–7.7)
NEUTROPHILS NFR BLD AUTO: 47.5 %
OSMOLALITY SERPL CALC.SUM OF ELEC: 289 MOSM/KG (ref 275–295)
OSMOLALITY SERPL CALC.SUM OF ELEC: 289 MOSM/KG (ref 275–295)
PLATELET # BLD AUTO: 335 10(3)UL (ref 150–450)
POTASSIUM SERPL-SCNC: 3.8 MMOL/L (ref 3.5–5.1)
POTASSIUM SERPL-SCNC: 3.8 MMOL/L (ref 3.5–5.1)
PROT SERPL-MCNC: 8.1 G/DL (ref 6.4–8.2)
PROT SERPL-MCNC: 8.1 G/DL (ref 6.4–8.2)
RBC # BLD AUTO: 4.7 X10(6)UL
SODIUM SERPL-SCNC: 140 MMOL/L (ref 136–145)
SODIUM SERPL-SCNC: 140 MMOL/L (ref 136–145)
T3FREE SERPL-MCNC: 2.51 PG/ML (ref 2.4–4.2)
T4 FREE SERPL-MCNC: 1.4 NG/DL (ref 0.8–1.7)
TSI SER-ACNC: 0.88 MIU/ML (ref 0.36–3.74)
VIT D+METAB SERPL-MCNC: 55.3 NG/ML (ref 30–100)
WBC # BLD AUTO: 4 X10(3) UL (ref 4–11)

## 2022-12-30 PROCEDURE — 85730 THROMBOPLASTIN TIME PARTIAL: CPT

## 2022-12-30 PROCEDURE — 85025 COMPLETE CBC W/AUTO DIFF WBC: CPT

## 2022-12-30 PROCEDURE — 84165 PROTEIN E-PHORESIS SERUM: CPT

## 2022-12-30 PROCEDURE — 84481 FREE ASSAY (FT-3): CPT

## 2022-12-30 PROCEDURE — 77075 RADEX OSSEOUS SURVEY COMPL: CPT | Performed by: INTERNAL MEDICINE

## 2022-12-30 PROCEDURE — 84443 ASSAY THYROID STIM HORMONE: CPT

## 2022-12-30 PROCEDURE — 80053 COMPREHEN METABOLIC PANEL: CPT

## 2022-12-30 PROCEDURE — 82627 DEHYDROEPIANDROSTERONE: CPT

## 2022-12-30 PROCEDURE — 84439 ASSAY OF FREE THYROXINE: CPT

## 2022-12-30 PROCEDURE — 84402 ASSAY OF FREE TESTOSTERONE: CPT

## 2022-12-30 PROCEDURE — 82784 ASSAY IGA/IGD/IGG/IGM EACH: CPT

## 2022-12-30 PROCEDURE — 82306 VITAMIN D 25 HYDROXY: CPT

## 2022-12-30 PROCEDURE — 86334 IMMUNOFIX E-PHORESIS SERUM: CPT

## 2022-12-30 PROCEDURE — 83521 IG LIGHT CHAINS FREE EACH: CPT

## 2022-12-30 PROCEDURE — 84403 ASSAY OF TOTAL TESTOSTERONE: CPT

## 2022-12-30 PROCEDURE — 36415 COLL VENOUS BLD VENIPUNCTURE: CPT

## 2022-12-31 ENCOUNTER — LAB ENCOUNTER (OUTPATIENT)
Dept: LAB | Facility: HOSPITAL | Age: 47
End: 2022-12-31
Attending: INTERNAL MEDICINE
Payer: COMMERCIAL

## 2022-12-31 DIAGNOSIS — R76.8 HIGH TOTAL IGG: ICD-10-CM

## 2022-12-31 LAB
M PROTEIN 24H UR ELPH-MRATE: 54 MG/24 HR (ref ?–149.1)
SPECIMEN VOL UR: 1000 ML

## 2022-12-31 PROCEDURE — 86335 IMMUNFIX E-PHORSIS/URINE/CSF: CPT

## 2022-12-31 PROCEDURE — 84156 ASSAY OF PROTEIN URINE: CPT

## 2022-12-31 PROCEDURE — 84166 PROTEIN E-PHORESIS/URINE/CSF: CPT

## 2023-01-02 LAB
ALBUMIN SERPL ELPH-MCNC: 4.18 G/DL (ref 3.75–5.21)
ALBUMIN/GLOB SERPL: 1.12 {RATIO} (ref 1–2)
ALPHA1 GLOB SERPL ELPH-MCNC: 0.29 G/DL (ref 0.19–0.46)
ALPHA2 GLOB SERPL ELPH-MCNC: 0.66 G/DL (ref 0.48–1.05)
B-GLOBULIN SERPL ELPH-MCNC: 0.62 G/DL (ref 0.68–1.23)
GAMMA GLOB SERPL ELPH-MCNC: 2.14 G/DL (ref 0.62–1.7)
KAPPA LC FREE SER-MCNC: 5.3 MG/DL (ref 0.33–1.94)
KAPPA LC FREE/LAMBDA FREE SER NEPH: 2.16 {RATIO} (ref 0.26–1.65)
LAMBDA LC FREE SERPL-MCNC: 2.45 MG/DL (ref 0.57–2.63)
PROT SERPL-MCNC: 7.9 G/DL (ref 6.4–8.2)

## 2023-01-04 LAB
TESTOSTERONE, FREE, S: 0.3 NG/DL
TESTOSTERONE, TOTAL, S: 14 NG/DL

## 2023-01-05 ENCOUNTER — TELEPHONE (OUTPATIENT)
Dept: HEMATOLOGY/ONCOLOGY | Facility: HOSPITAL | Age: 48
End: 2023-01-05

## 2023-01-05 DIAGNOSIS — R76.8 ELEVATED SERUM IMMUNOGLOBULIN FREE LIGHT CHAIN LEVEL: Primary | ICD-10-CM

## 2023-01-05 LAB — ANTI-MULLERIAN HORMONE: 1.11 NG/ML

## 2023-01-05 NOTE — TELEPHONE ENCOUNTER
I called the patient and left a voice message for her regarding test results following recent evaluation. Informed the patient she could call me back to discuss the results. MD [facility]  [address], [employer], [place]

## 2023-01-05 NOTE — TELEPHONE ENCOUNTER
Called the pt and reviewed her results show no abnormal features save for an elevated kappa light chain level and inc SFLC value. No signs of holes in the bone by skeletal survey or abnormal proteins in urine w/ no hypercalcemia, renal insufficiency or anemia. No monoclonal protein noted as well. We reviewed that in general, I would just repeat her labs in 6 months at her follow up. However, she is planning IVF pregnancy, so we will r/o signs of smoldering myeloma with bone marrow biopsy evaluation. I will call her to review those results with her. She will still f/u with me in 6 months as planned. Alice Britton MD  [place] [address] [facility]  [address] [place] [address]  [address] [facility], [address]

## 2023-01-25 ENCOUNTER — LAB ENCOUNTER (OUTPATIENT)
Dept: LAB | Facility: HOSPITAL | Age: 48
End: 2023-01-25
Attending: INTERNAL MEDICINE
Payer: COMMERCIAL

## 2023-01-25 DIAGNOSIS — Z11.59 ENCOUNTER FOR SCREENING FOR OTHER VIRAL DISEASES: ICD-10-CM

## 2023-01-26 LAB — SARS-COV-2 RNA RESP QL NAA+PROBE: NOT DETECTED

## 2023-01-27 ENCOUNTER — HOSPITAL ENCOUNTER (OUTPATIENT)
Dept: CT IMAGING | Facility: HOSPITAL | Age: 48
Discharge: HOME | End: 2023-01-27
Attending: INTERNAL MEDICINE
Payer: COMMERCIAL

## 2023-01-27 VITALS
SYSTOLIC BLOOD PRESSURE: 105 MMHG | DIASTOLIC BLOOD PRESSURE: 73 MMHG | OXYGEN SATURATION: 98 % | RESPIRATION RATE: 18 BRPM | HEART RATE: 87 BPM

## 2023-01-27 DIAGNOSIS — R76.8 ELEVATED SERUM IMMUNOGLOBULIN FREE LIGHT CHAIN LEVEL: ICD-10-CM

## 2023-01-27 PROCEDURE — 36415 COLL VENOUS BLD VENIPUNCTURE: CPT | Performed by: RADIOLOGY

## 2023-01-27 PROCEDURE — 88189 FLOWCYTOMETRY/READ 16 & >: CPT | Performed by: RADIOLOGY

## 2023-01-27 PROCEDURE — 88305 TISSUE EXAM BY PATHOLOGIST: CPT | Performed by: INTERNAL MEDICINE

## 2023-01-27 PROCEDURE — 38222 DX BONE MARROW BX & ASPIR: CPT | Performed by: INTERNAL MEDICINE

## 2023-01-27 PROCEDURE — 99152 MOD SED SAME PHYS/QHP 5/>YRS: CPT | Performed by: INTERNAL MEDICINE

## 2023-01-27 PROCEDURE — 88184 FLOWCYTOMETRY/ TC 1 MARKER: CPT | Performed by: RADIOLOGY

## 2023-01-27 PROCEDURE — 88237 TISSUE CULTURE BONE MARROW: CPT | Performed by: RADIOLOGY

## 2023-01-27 PROCEDURE — 88185 FLOWCYTOMETRY/TC ADD-ON: CPT | Performed by: RADIOLOGY

## 2023-01-27 PROCEDURE — 88264 CHROMOSOME ANALYSIS 20-25: CPT | Performed by: RADIOLOGY

## 2023-01-27 PROCEDURE — 77012 CT SCAN FOR NEEDLE BIOPSY: CPT | Performed by: INTERNAL MEDICINE

## 2023-01-27 PROCEDURE — 85097 BONE MARROW INTERPRETATION: CPT | Performed by: INTERNAL MEDICINE

## 2023-01-27 PROCEDURE — 88313 SPECIAL STAINS GROUP 2: CPT | Performed by: INTERNAL MEDICINE

## 2023-01-27 PROCEDURE — 88311 DECALCIFY TISSUE: CPT | Performed by: INTERNAL MEDICINE

## 2023-01-27 PROCEDURE — 88291 CYTO/MOLECULAR REPORT: CPT | Performed by: RADIOLOGY

## 2023-01-27 RX ORDER — NALOXONE HYDROCHLORIDE 0.4 MG/ML
80 INJECTION, SOLUTION INTRAMUSCULAR; INTRAVENOUS; SUBCUTANEOUS AS NEEDED
Status: DISCONTINUED | OUTPATIENT
Start: 2023-01-27 | End: 2023-01-29

## 2023-01-27 RX ORDER — MIDAZOLAM HYDROCHLORIDE 1 MG/ML
INJECTION INTRAMUSCULAR; INTRAVENOUS
Status: COMPLETED
Start: 2023-01-27 | End: 2023-01-27

## 2023-01-27 RX ORDER — FLUMAZENIL 0.1 MG/ML
0.2 INJECTION INTRAVENOUS AS NEEDED
Status: DISCONTINUED | OUTPATIENT
Start: 2023-01-27 | End: 2023-01-29

## 2023-01-27 RX ORDER — MIDAZOLAM HYDROCHLORIDE 1 MG/ML
1 INJECTION INTRAMUSCULAR; INTRAVENOUS EVERY 5 MIN PRN
Status: DISCONTINUED | OUTPATIENT
Start: 2023-01-27 | End: 2023-01-29

## 2023-01-27 RX ORDER — HEPARIN SODIUM 1000 [USP'U]/ML
INJECTION, SOLUTION INTRAVENOUS; SUBCUTANEOUS
Status: COMPLETED
Start: 2023-01-27 | End: 2023-01-27

## 2023-01-27 RX ORDER — SODIUM CHLORIDE 9 MG/ML
INJECTION, SOLUTION INTRAVENOUS CONTINUOUS
Status: DISCONTINUED | OUTPATIENT
Start: 2023-01-27 | End: 2023-01-29

## 2023-01-27 RX ADMIN — SODIUM CHLORIDE: 9 INJECTION, SOLUTION INTRAVENOUS at 10:05:00

## 2023-01-27 RX ADMIN — HEPARIN SODIUM: 1000 INJECTION, SOLUTION INTRAVENOUS; SUBCUTANEOUS at 10:26:00

## 2023-01-27 RX ADMIN — MIDAZOLAM HYDROCHLORIDE 2 MG: 1 INJECTION INTRAMUSCULAR; INTRAVENOUS at 10:12:00

## 2023-01-27 NOTE — DISCHARGE INSTRUCTIONS
Procedure performed by Dr. Maylin Gutierrez. Biopsy/Aspiration of BONE MARROW. DISCHARGE INSTRUCTIONS                                   DO NOT TAKE aspirin-containing products, Ibuprofen, Vitamin E, or                              blood thinning products for three (3) days after the procedure. You may                             take Tylenol (1 or 2 tablets every 4-6 hours) for mild discomfort at the                             biopsy site. Rest quietly for 24 hours, no physical activity. Avoid                              straining, heavy lifting, or strenuous physical activity for approximately                             2 days. Report any bleeding at aspiration/biopsy site, redness,                             swelling, odor, discharge, pain or fever that does not lessen after one                              day, to your physician. Resume a regular diet. Call your physician with                             questions or test results. Also you may contact the Radiology Nurse                             at [phone] with any additional questions or concerns. BRING THIS SHEET WITH YOU SHOULD YOU HAVE TO VISIT AN EMERGENCY ROOM OR SEE YOUR DOCTOR IN THE NEXT 24 HOURS.

## 2023-01-28 LAB
CD10 CELLS NFR SPEC: <1 %
CD10/CD19: <1 %
CD19 CELLS NFR SPEC: 35 %
CD19+ MM: 83 %
CD19+/CD200+: 9 %
CD2 CELLS NFR SPEC: 57 %
CD20 CELLS NFR SPEC: 34 %
CD200 CELLS: 9 %
CD3 CELLS NFR SPEC: 54 %
CD3+/TCRGD+: 2 %
CD3+CD4+ CELLS NFR SPEC: 33 %
CD3+CD4+ CELLS/CD3+CD8+ CLL SPEC: 1.7
CD3+CD8+ CELLS NFR SPEC: 19 %
CD3-/CD56+: 5 %
CD34 CELLS NFR SPEC: <1 %
CD38 CELLS NFR SPEC: 11 %
CD38+/CD19+: 7 %
CD45 CELLS NFR SPEC: 100 %
CD45-/CD38+ KAPPA: 39 %
CD45-/CD38+ LAMBDA: 14 %
CD5 CELLS NFR SPEC: 53 %
CD5/CD19 CELLS: 2 %
CD56 MM: 80 %
CD7 CELLS NFR SPEC: 44 %
CELL SURF KAPPA/LAMBDA RATIO: 1.3
CELL SURF LAMBDA LIGHT CHAIN: 15 %
CELL SURFACE KAPPA LIGHT CHAIN: 20 %
TCR G-D CELLS NFR SPEC: 2 %

## 2023-01-30 ENCOUNTER — PATIENT MESSAGE (OUTPATIENT)
Dept: ENDOCRINOLOGY CLINIC | Facility: CLINIC | Age: 48
End: 2023-01-30

## 2023-01-31 DIAGNOSIS — R60.0 FLUID RETENTION IN LEGS: ICD-10-CM

## 2023-01-31 NOTE — TELEPHONE ENCOUNTER
From: Jeffry Christian  To: Alina DelacruzSAMANTHA  Sent: 1/30/2023 9:54 PM CST  Subject: Request for Referral Note    Hi Nurse Marylou Cuevas,    I know you said you would provide Dr. Ismael Recinos with notes and that I could share those with the Reproductive Immunologist (Dr. Jacqueline Blake) who originally requested this endocrinology referral. However, I want to ensure that Dr. Jacqueline Blake receives the information in a timely fashion & can consult directly with you if necessary. I have attached the referral request he provided which was uploaded but may not have been shared with you.

## 2023-02-01 NOTE — TELEPHONE ENCOUNTER
Yes, I had sent a note attached to my visit note to send to Dr. Ijeoma Marroquin but maybe it didn't go through but please refax, thanks.

## 2023-02-01 NOTE — TELEPHONE ENCOUNTER
OV note from 12/21/22 and A1C result faxed to Dr. Fina Van at [phone]. MyChart sent to patient.

## 2023-02-02 ENCOUNTER — TELEPHONE (OUTPATIENT)
Dept: HEMATOLOGY/ONCOLOGY | Facility: HOSPITAL | Age: 48
End: 2023-02-02

## 2023-02-02 NOTE — TELEPHONE ENCOUNTER
Please review. Protocol Failed or has no protocol   Requested Prescriptions   Pending Prescriptions Disp Refills    POTASSIUM CHLORIDE 10 MEQ Oral Tab CR [Pharmacy Med Name: POTASSIUM CL MICRO 10MEQ ER TABS] 90 tablet 0     Sig: TAKE 1 TABLET(10 MEQ) BY MOUTH DAILY WITH FUROSEMIDE       There is no refill protocol information for this order         Recent Outpatient Visits              1 month ago High total [address] Hematology Oncology Vandana Giron MD    Office Visit    1 month ago Prediabetes    [facility], [address] [place] [address], [address] [place] [address] Due, APRN    Office Visit    1 month ago Encounter for cervical Pap smear with pelvic exam    [address], [place]    Office Visit    3 months ago Bilateral leg edema    [facility], [address]    Office Visit    3 months ago Need for vaccination    [address], [address]    Nurse Only          Future Appointments         Provider Department Appt Notes    In 1 month SAMANTHA Lares [address], [address] [place] [address] Follow up from initial appointment in December. In 2 months Rashaun Erazo DO [address] [facility] px, last px: 03/18/21    In 4 months Vandana Giron MD [facility] Hematology Oncology FOLLOW UP VISIT. CL

## 2023-02-02 NOTE — TELEPHONE ENCOUNTER
I called the patient regarding her bone marrow biopsy results from 1/27/2023. We reviewed her bone marrow biopsy shows no evidence of lymphoma, plasma cell neoplasm/MGUS or hematologic cancer or nonhematologic neoplastic process/cancer. This is a good news result. The patient will keep her planned 6 month follow up for repeat lab testing. She thanked me for the call and information. Jory Kapoor MD  [facility]  [address]

## 2023-02-03 RX ORDER — POTASSIUM CHLORIDE 750 MG/1
TABLET, EXTENDED RELEASE ORAL
Qty: 90 TABLET | Refills: 0 | Status: SHIPPED | OUTPATIENT
Start: 2023-02-03

## 2023-03-07 ENCOUNTER — PATIENT MESSAGE (OUTPATIENT)
Dept: PULMONOLOGY | Facility: CLINIC | Age: 48
End: 2023-03-07

## 2023-03-07 NOTE — TELEPHONE ENCOUNTER
Patient was last seen 5/22/22. Needs to schedule an appointment for any orders. My chart message sent to patient.

## 2023-03-07 NOTE — TELEPHONE ENCOUNTER
From: Gerber Franklin  To: Stacey Snow MD  Sent: 3/7/2023 4:32 PM CST  Subject: Prescription for Brandie Melecio Dr. Caro Campbell,    As a result of my sleep study, you suggested a sleep appliance on May 3, 2022. I have since received one from my dentist, however they have had trouble billing the insurance for it. Can you provide a prescription for the device so that it can be covered by insurance? Please let me know if you need additional information. Thank you and enjoy your day! [facility]   [phone]

## 2023-03-08 ENCOUNTER — TELEPHONE (OUTPATIENT)
Dept: PULMONOLOGY | Facility: CLINIC | Age: 48
End: 2023-03-08

## 2023-03-08 DIAGNOSIS — G47.33 OSA (OBSTRUCTIVE SLEEP APNEA): Primary | ICD-10-CM

## 2023-03-08 NOTE — TELEPHONE ENCOUNTER
Patient saw her normal dentist and had oral appliance made, requesting order for insurance and billing purposes. Dr. Ania Landis- please review and sign pending DME order if agreeable.      ----- Message from [facility]. Maxime Patton sent at 3/8/2023  1:22 PM CST -----  Regarding: Prescription for Dental Appliance  Contact: [phone]  Nixon Ortiz,    I think you totally misread or misunderstood my message & request. I am doing just fine with mumy dental appliance. I am basically asking for a copy of the order for the dental appliance to be given to me. Dr. Ania Landis recommended the appliance during our last appointment & unfortunately, the dentist office cannot locate the original I provided. Feel free to give me a call if you need further clarification or information.  Thank you!    --Jennifer Hanna

## 2023-03-23 ENCOUNTER — OFFICE VISIT (OUTPATIENT)
Dept: ENDOCRINOLOGY CLINIC | Facility: CLINIC | Age: 48
End: 2023-03-23

## 2023-03-23 VITALS
SYSTOLIC BLOOD PRESSURE: 108 MMHG | BODY MASS INDEX: 33 KG/M2 | DIASTOLIC BLOOD PRESSURE: 76 MMHG | HEART RATE: 83 BPM | WEIGHT: 191 LBS

## 2023-03-23 DIAGNOSIS — R73.03 PREDIABETES: Primary | ICD-10-CM

## 2023-03-23 LAB
CARTRIDGE LOT#: NORMAL NUMERIC
GLUCOSE BLOOD: 95
HEMOGLOBIN A1C: 5.5 % (ref 4.3–5.6)
TEST STRIP LOT #: NORMAL NUMERIC

## 2023-03-23 PROCEDURE — 3074F SYST BP LT 130 MM HG: CPT

## 2023-03-23 PROCEDURE — 83036 HEMOGLOBIN GLYCOSYLATED A1C: CPT

## 2023-03-23 PROCEDURE — 3078F DIAST BP <80 MM HG: CPT

## 2023-03-23 PROCEDURE — 82947 ASSAY GLUCOSE BLOOD QUANT: CPT

## 2023-03-23 PROCEDURE — 99214 OFFICE O/P EST MOD 30 MIN: CPT

## 2023-03-23 RX ORDER — METFORMIN HYDROCHLORIDE 500 MG/1
500 TABLET, EXTENDED RELEASE ORAL 2 TIMES DAILY
COMMUNITY
Start: 2023-02-09

## 2023-03-23 RX ORDER — LANCETS 33 GAUGE
EACH MISCELLANEOUS
Qty: 200 EACH | Refills: 0 | Status: SHIPPED | OUTPATIENT
Start: 2023-03-23

## 2023-03-23 RX ORDER — BLOOD-GLUCOSE METER
1 EACH MISCELLANEOUS DAILY
Qty: 1 KIT | Refills: 0 | Status: SHIPPED | OUTPATIENT
Start: 2023-03-23

## 2023-03-23 RX ORDER — TACROLIMUS 1 MG/1
1 CAPSULE ORAL 2 TIMES DAILY
Qty: 60 CAPSULE | Refills: 0 | COMMUNITY
Start: 2023-03-23

## 2023-03-23 RX ORDER — HYDROXYCHLOROQUINE SULFATE 200 MG/1
200 TABLET, FILM COATED ORAL 2 TIMES DAILY
Qty: 60 TABLET | Refills: 0 | COMMUNITY
Start: 2023-03-23

## 2023-03-23 RX ORDER — BLOOD SUGAR DIAGNOSTIC
STRIP MISCELLANEOUS
Qty: 200 STRIP | Refills: 0 | Status: SHIPPED | OUTPATIENT
Start: 2023-03-23

## 2023-04-11 ENCOUNTER — LAB ENCOUNTER (OUTPATIENT)
Dept: LAB | Age: 48
End: 2023-04-11
Attending: FAMILY MEDICINE
Payer: COMMERCIAL

## 2023-04-11 ENCOUNTER — OFFICE VISIT (OUTPATIENT)
Dept: FAMILY MEDICINE CLINIC | Facility: CLINIC | Age: 48
End: 2023-04-11

## 2023-04-11 VITALS
TEMPERATURE: 97 F | SYSTOLIC BLOOD PRESSURE: 109 MMHG | HEART RATE: 99 BPM | WEIGHT: 189 LBS | BODY MASS INDEX: 32.27 KG/M2 | HEIGHT: 64 IN | DIASTOLIC BLOOD PRESSURE: 72 MMHG

## 2023-04-11 DIAGNOSIS — R94.31 ABNORMAL FINDING ON EKG: ICD-10-CM

## 2023-04-11 DIAGNOSIS — R73.03 PREDIABETES: ICD-10-CM

## 2023-04-11 DIAGNOSIS — Z00.00 ROUTINE PHYSICAL EXAMINATION: Primary | ICD-10-CM

## 2023-04-11 DIAGNOSIS — Z00.00 ROUTINE PHYSICAL EXAMINATION: ICD-10-CM

## 2023-04-11 LAB
ALBUMIN SERPL-MCNC: 3.6 G/DL (ref 3.4–5)
ALBUMIN/GLOB SERPL: 0.9 {RATIO} (ref 1–2)
ALP LIVER SERPL-CCNC: 47 U/L
ALT SERPL-CCNC: 21 U/L
ANION GAP SERPL CALC-SCNC: 5 MMOL/L (ref 0–18)
AST SERPL-CCNC: 14 U/L (ref 15–37)
ATRIAL RATE: 82 BPM
BASOPHILS # BLD AUTO: 0.06 X10(3) UL (ref 0–0.2)
BASOPHILS NFR BLD AUTO: 0.8 %
BILIRUB SERPL-MCNC: 0.3 MG/DL (ref 0.1–2)
BILIRUB UR QL: NEGATIVE
BUN BLD-MCNC: 15 MG/DL (ref 7–18)
BUN/CREAT SERPL: 14.9 (ref 10–20)
CALCIUM BLD-MCNC: 9 MG/DL (ref 8.5–10.1)
CHLORIDE SERPL-SCNC: 109 MMOL/L (ref 98–112)
CHOLEST SERPL-MCNC: 149 MG/DL (ref ?–200)
CLARITY UR: CLEAR
CO2 SERPL-SCNC: 24 MMOL/L (ref 21–32)
COLOR UR: YELLOW
CREAT BLD-MCNC: 1.01 MG/DL
DEPRECATED RDW RBC AUTO: 43.2 FL (ref 35.1–46.3)
EOSINOPHIL # BLD AUTO: 0.01 X10(3) UL (ref 0–0.7)
EOSINOPHIL NFR BLD AUTO: 0.1 %
ERYTHROCYTE [DISTWIDTH] IN BLOOD BY AUTOMATED COUNT: 13 % (ref 11–15)
EST. AVERAGE GLUCOSE BLD GHB EST-MCNC: 120 MG/DL (ref 68–126)
FASTING PATIENT LIPID ANSWER: NO
FASTING STATUS PATIENT QL REPORTED: NO
GFR SERPLBLD BASED ON 1.73 SQ M-ARVRAT: 69 ML/MIN/1.73M2 (ref 60–?)
GLOBULIN PLAS-MCNC: 4 G/DL (ref 2.8–4.4)
GLUCOSE BLD-MCNC: 99 MG/DL (ref 70–99)
GLUCOSE UR-MCNC: NORMAL MG/DL
HBA1C MFR BLD: 5.8 % (ref ?–5.7)
HCT VFR BLD AUTO: 40.1 %
HDLC SERPL-MCNC: 43 MG/DL (ref 40–59)
HGB BLD-MCNC: 12.8 G/DL
HGB UR QL STRIP.AUTO: NEGATIVE
IMM GRANULOCYTES # BLD AUTO: 0.03 X10(3) UL (ref 0–1)
IMM GRANULOCYTES NFR BLD: 0.4 %
KETONES UR-MCNC: NEGATIVE MG/DL
LDLC SERPL CALC-MCNC: 89 MG/DL (ref ?–100)
LEUKOCYTE ESTERASE UR QL STRIP.AUTO: NEGATIVE
LYMPHOCYTES # BLD AUTO: 1.76 X10(3) UL (ref 1–4)
LYMPHOCYTES NFR BLD AUTO: 23.5 %
MCH RBC QN AUTO: 29 PG (ref 26–34)
MCHC RBC AUTO-ENTMCNC: 31.9 G/DL (ref 31–37)
MCV RBC AUTO: 90.9 FL
MONOCYTES # BLD AUTO: 0.41 X10(3) UL (ref 0.1–1)
MONOCYTES NFR BLD AUTO: 5.5 %
NEUTROPHILS # BLD AUTO: 5.23 X10 (3) UL (ref 1.5–7.7)
NEUTROPHILS # BLD AUTO: 5.23 X10(3) UL (ref 1.5–7.7)
NEUTROPHILS NFR BLD AUTO: 69.7 %
NITRITE UR QL STRIP.AUTO: NEGATIVE
NONHDLC SERPL-MCNC: 106 MG/DL (ref ?–130)
OSMOLALITY SERPL CALC.SUM OF ELEC: 287 MOSM/KG (ref 275–295)
P AXIS: 44 DEGREES
P-R INTERVAL: 142 MS
PH UR: 5.5 [PH] (ref 5–8)
PLATELET # BLD AUTO: 330 10(3)UL (ref 150–450)
POTASSIUM SERPL-SCNC: 3.8 MMOL/L (ref 3.5–5.1)
PROT SERPL-MCNC: 7.6 G/DL (ref 6.4–8.2)
PROT UR-MCNC: 70 MG/DL
Q-T INTERVAL: 390 MS
QRS DURATION: 74 MS
QTC CALCULATION (BEZET): 455 MS
R AXIS: -10 DEGREES
RBC # BLD AUTO: 4.41 X10(6)UL
SODIUM SERPL-SCNC: 138 MMOL/L (ref 136–145)
SP GR UR STRIP: >1.03 (ref 1–1.03)
T AXIS: 5 DEGREES
TRIGL SERPL-MCNC: 92 MG/DL (ref 30–149)
TSI SER-ACNC: 0.69 MIU/ML (ref 0.36–3.74)
UROBILINOGEN UR STRIP-ACNC: NORMAL
VENTRICULAR RATE: 82 BPM
VLDLC SERPL CALC-MCNC: 15 MG/DL (ref 0–30)
WBC # BLD AUTO: 7.5 X10(3) UL (ref 4–11)

## 2023-04-11 PROCEDURE — 81001 URINALYSIS AUTO W/SCOPE: CPT

## 2023-04-11 PROCEDURE — 80053 COMPREHEN METABOLIC PANEL: CPT

## 2023-04-11 PROCEDURE — 3074F SYST BP LT 130 MM HG: CPT | Performed by: FAMILY MEDICINE

## 2023-04-11 PROCEDURE — 93000 ELECTROCARDIOGRAM COMPLETE: CPT | Performed by: FAMILY MEDICINE

## 2023-04-11 PROCEDURE — 80061 LIPID PANEL: CPT

## 2023-04-11 PROCEDURE — 85025 COMPLETE CBC W/AUTO DIFF WBC: CPT

## 2023-04-11 PROCEDURE — 36415 COLL VENOUS BLD VENIPUNCTURE: CPT

## 2023-04-11 PROCEDURE — 3078F DIAST BP <80 MM HG: CPT | Performed by: FAMILY MEDICINE

## 2023-04-11 PROCEDURE — 3008F BODY MASS INDEX DOCD: CPT | Performed by: FAMILY MEDICINE

## 2023-04-11 PROCEDURE — 84443 ASSAY THYROID STIM HORMONE: CPT

## 2023-04-11 PROCEDURE — 99396 PREV VISIT EST AGE 40-64: CPT | Performed by: FAMILY MEDICINE

## 2023-04-11 PROCEDURE — 83036 HEMOGLOBIN GLYCOSYLATED A1C: CPT

## 2023-04-11 NOTE — PATIENT INSTRUCTIONS
All adult screening ordered and done appropriate for patient's age and gender and risk factors and complaints. Medication reviewed and renewed where needed and appropriate. Comply with medications. Encouraged physical fitness and daily physical activity daily.

## 2023-04-17 ENCOUNTER — HOSPITAL ENCOUNTER (OUTPATIENT)
Dept: CV DIAGNOSTICS | Facility: HOSPITAL | Age: 48
Discharge: HOME | End: 2023-04-17
Attending: FAMILY MEDICINE
Payer: COMMERCIAL

## 2023-04-17 ENCOUNTER — PATIENT MESSAGE (OUTPATIENT)
Dept: FAMILY MEDICINE CLINIC | Facility: CLINIC | Age: 48
End: 2023-04-17

## 2023-04-17 DIAGNOSIS — R94.31 ABNORMAL FINDING ON EKG: ICD-10-CM

## 2023-04-17 LAB
% OF MAX PREDICTED HR: 100 %
MAX DIASTOLIC BP: 76 MMHG
MAX HEART RATE: 173 BPM
MAX PREDICTED HEART RATE: 173 BPM
MAX SYSTOLIC BP: 176 MMHG
MAX WORK LOAD: 125

## 2023-04-17 PROCEDURE — 93017 CV STRESS TEST TRACING ONLY: CPT | Performed by: FAMILY MEDICINE

## 2023-04-17 PROCEDURE — 93018 CV STRESS TEST I&R ONLY: CPT | Performed by: INTERNAL MEDICINE

## 2023-04-17 PROCEDURE — 93016 CV STRESS TEST SUPVJ ONLY: CPT | Performed by: INTERNAL MEDICINE

## 2023-04-18 NOTE — TELEPHONE ENCOUNTER
From: Vero Cadena  To: Hong Adorno DO  Sent: 4/17/2023 10:29 AM CDT  Subject: EKG & Stress Test Results    Please fax ([phone]) the results of my EKG & Stress Test to Dr. Christinia Goodell, my IVF & Gynecologists office as soon as they are available. Thank you and feel free to call me with any questions/concerns.     --Jennifer Hanna

## 2023-04-28 ENCOUNTER — TELEPHONE (OUTPATIENT)
Dept: FAMILY MEDICINE CLINIC | Facility: CLINIC | Age: 48
End: 2023-04-28

## 2023-04-28 NOTE — TELEPHONE ENCOUNTER
I have done a letter. Sent via [address], but please fax to number below and let patient know.

## 2023-04-28 NOTE — TELEPHONE ENCOUNTER
would you be able to sign letter for patient? If so, will pend letter. Patient states by today she needs a letter faxed to her IVF gynecologist Waldemar Martinez. Fax#  [phone]. Letter needs to state patient is medically cleared to move forward for patient to have an embryo transplant. Patient states she had normal EKG and stress test and was seen recently (4/11/23) in the office. Patient would like a call when letter sent.

## 2023-05-08 ENCOUNTER — HOSPITAL ENCOUNTER (OUTPATIENT)
Age: 48
Discharge: HOME | End: 2023-05-08
Payer: COMMERCIAL

## 2023-05-08 VITALS
OXYGEN SATURATION: 100 % | HEART RATE: 83 BPM | TEMPERATURE: 98 F | SYSTOLIC BLOOD PRESSURE: 110 MMHG | RESPIRATION RATE: 20 BRPM | DIASTOLIC BLOOD PRESSURE: 59 MMHG

## 2023-05-08 DIAGNOSIS — U07.1 COVID-19 VIRUS DETECTED: Primary | ICD-10-CM

## 2023-05-08 DIAGNOSIS — Z20.822 ENCOUNTER FOR LABORATORY TESTING FOR COVID-19 VIRUS: ICD-10-CM

## 2023-05-08 LAB — SARS-COV-2 RNA RESP QL NAA+PROBE: DETECTED

## 2023-05-08 PROCEDURE — 99213 OFFICE O/P EST LOW 20 MIN: CPT | Performed by: NURSE PRACTITIONER

## 2023-05-08 PROCEDURE — U0002 COVID-19 LAB TEST NON-CDC: HCPCS | Performed by: NURSE PRACTITIONER

## 2023-05-15 ENCOUNTER — MED REC SCAN ONLY (OUTPATIENT)
Dept: FAMILY MEDICINE CLINIC | Facility: CLINIC | Age: 48
End: 2023-05-15

## 2023-06-29 ENCOUNTER — APPOINTMENT (OUTPATIENT)
Dept: HEMATOLOGY/ONCOLOGY | Facility: HOSPITAL | Age: 48
End: 2023-06-29
Attending: INTERNAL MEDICINE
Payer: COMMERCIAL

## 2023-06-30 DIAGNOSIS — R76.8 HIGH TOTAL IGG: ICD-10-CM

## 2023-06-30 DIAGNOSIS — R76.8 ELEVATED SERUM IMMUNOGLOBULIN FREE LIGHT CHAIN LEVEL: Primary | ICD-10-CM

## 2023-07-01 ENCOUNTER — LAB ENCOUNTER (OUTPATIENT)
Dept: LAB | Facility: HOSPITAL | Age: 48
End: 2023-07-01
Attending: INTERNAL MEDICINE
Payer: COMMERCIAL

## 2023-07-01 DIAGNOSIS — R76.8 ELEVATED SERUM IMMUNOGLOBULIN FREE LIGHT CHAIN LEVEL: ICD-10-CM

## 2023-07-01 DIAGNOSIS — R76.8 HIGH TOTAL IGG: ICD-10-CM

## 2023-07-01 LAB
ALBUMIN SERPL-MCNC: 3.4 G/DL (ref 3.4–5)
ALBUMIN/GLOB SERPL: 0.9 {RATIO} (ref 1–2)
ALP LIVER SERPL-CCNC: 46 U/L
ALT SERPL-CCNC: 20 U/L
ANION GAP SERPL CALC-SCNC: 4 MMOL/L (ref 0–18)
AST SERPL-CCNC: 12 U/L (ref 15–37)
BASOPHILS # BLD AUTO: 0.07 X10(3) UL (ref 0–0.2)
BASOPHILS NFR BLD AUTO: 1.1 %
BILIRUB SERPL-MCNC: 0.4 MG/DL (ref 0.1–2)
BUN BLD-MCNC: 9 MG/DL (ref 7–18)
BUN/CREAT SERPL: 8.7 (ref 10–20)
CALCIUM BLD-MCNC: 8.8 MG/DL (ref 8.5–10.1)
CHLORIDE SERPL-SCNC: 112 MMOL/L (ref 98–112)
CO2 SERPL-SCNC: 27 MMOL/L (ref 21–32)
CREAT BLD-MCNC: 1.04 MG/DL
DEPRECATED RDW RBC AUTO: 45.1 FL (ref 35.1–46.3)
EOSINOPHIL # BLD AUTO: 0.07 X10(3) UL (ref 0–0.7)
EOSINOPHIL NFR BLD AUTO: 1.1 %
ERYTHROCYTE [DISTWIDTH] IN BLOOD BY AUTOMATED COUNT: 13.2 % (ref 11–15)
FASTING STATUS PATIENT QL REPORTED: NO
GFR SERPLBLD BASED ON 1.73 SQ M-ARVRAT: 67 ML/MIN/1.73M2 (ref 60–?)
GLOBULIN PLAS-MCNC: 3.6 G/DL (ref 2.8–4.4)
GLUCOSE BLD-MCNC: 104 MG/DL (ref 70–99)
HCT VFR BLD AUTO: 42.7 %
HGB BLD-MCNC: 13.9 G/DL
IGA SERPL-MCNC: <7.83 MG/DL (ref 70–312)
IGM SERPL-MCNC: 88 MG/DL (ref 43–279)
IMM GRANULOCYTES # BLD AUTO: 0.02 X10(3) UL (ref 0–1)
IMM GRANULOCYTES NFR BLD: 0.3 %
IMMUNOGLOBULIN PNL SER-MCNC: 1580 MG/DL (ref 791–1643)
LYMPHOCYTES # BLD AUTO: 2.36 X10(3) UL (ref 1–4)
LYMPHOCYTES NFR BLD AUTO: 37.7 %
MCH RBC QN AUTO: 30.1 PG (ref 26–34)
MCHC RBC AUTO-ENTMCNC: 32.6 G/DL (ref 31–37)
MCV RBC AUTO: 92.4 FL
MONOCYTES # BLD AUTO: 0.72 X10(3) UL (ref 0.1–1)
MONOCYTES NFR BLD AUTO: 11.5 %
NEUTROPHILS # BLD AUTO: 3.02 X10 (3) UL (ref 1.5–7.7)
NEUTROPHILS # BLD AUTO: 3.02 X10(3) UL (ref 1.5–7.7)
NEUTROPHILS NFR BLD AUTO: 48.3 %
OSMOLALITY SERPL CALC.SUM OF ELEC: 295 MOSM/KG (ref 275–295)
PLATELET # BLD AUTO: 315 10(3)UL (ref 150–450)
POTASSIUM SERPL-SCNC: 3.8 MMOL/L (ref 3.5–5.1)
PROT SERPL-MCNC: 7 G/DL (ref 6.4–8.2)
PROT UR-MCNC: 50.5 MG/DL
RBC # BLD AUTO: 4.62 X10(6)UL
SODIUM SERPL-SCNC: 143 MMOL/L (ref 136–145)
WBC # BLD AUTO: 6.3 X10(3) UL (ref 4–11)

## 2023-07-01 PROCEDURE — 85025 COMPLETE CBC W/AUTO DIFF WBC: CPT

## 2023-07-01 PROCEDURE — 84165 PROTEIN E-PHORESIS SERUM: CPT

## 2023-07-01 PROCEDURE — 84166 PROTEIN E-PHORESIS/URINE/CSF: CPT

## 2023-07-01 PROCEDURE — 82784 ASSAY IGA/IGD/IGG/IGM EACH: CPT

## 2023-07-01 PROCEDURE — 86334 IMMUNOFIX E-PHORESIS SERUM: CPT

## 2023-07-01 PROCEDURE — 80053 COMPREHEN METABOLIC PANEL: CPT

## 2023-07-01 PROCEDURE — 36415 COLL VENOUS BLD VENIPUNCTURE: CPT

## 2023-07-01 PROCEDURE — 83521 IG LIGHT CHAINS FREE EACH: CPT

## 2023-07-01 PROCEDURE — 86335 IMMUNFIX E-PHORSIS/URINE/CSF: CPT

## 2023-07-01 PROCEDURE — 84156 ASSAY OF PROTEIN URINE: CPT

## 2023-07-03 ENCOUNTER — OFFICE VISIT (OUTPATIENT)
Dept: HEMATOLOGY/ONCOLOGY | Facility: HOSPITAL | Age: 48
End: 2023-07-03
Attending: INTERNAL MEDICINE
Payer: COMMERCIAL

## 2023-07-03 ENCOUNTER — DOCUMENTATION ONLY (OUTPATIENT)
Dept: HEMATOLOGY/ONCOLOGY | Facility: HOSPITAL | Age: 48
End: 2023-07-03

## 2023-07-03 VITALS
WEIGHT: 188.81 LBS | HEIGHT: 65 IN | HEART RATE: 92 BPM | SYSTOLIC BLOOD PRESSURE: 111 MMHG | DIASTOLIC BLOOD PRESSURE: 53 MMHG | OXYGEN SATURATION: 98 % | TEMPERATURE: 98 F | RESPIRATION RATE: 16 BRPM | BODY MASS INDEX: 31.46 KG/M2

## 2023-07-03 DIAGNOSIS — R76.8 ELEVATED SERUM IMMUNOGLOBULIN FREE LIGHT CHAIN LEVEL: Primary | ICD-10-CM

## 2023-07-03 LAB
ALBUMIN SERPL ELPH-MCNC: 3.79 G/DL (ref 3.75–5.21)
ALBUMIN/GLOB SERPL: 1.35 {RATIO} (ref 1–2)
ALPHA1 GLOB SERPL ELPH-MCNC: 0.3 G/DL (ref 0.19–0.46)
ALPHA2 GLOB SERPL ELPH-MCNC: 0.55 G/DL (ref 0.48–1.05)
B-GLOBULIN SERPL ELPH-MCNC: 0.59 G/DL (ref 0.68–1.23)
GAMMA GLOB SERPL ELPH-MCNC: 1.37 G/DL (ref 0.62–1.7)
KAPPA LC FREE SER-MCNC: 3.25 MG/DL (ref 0.33–1.94)
KAPPA LC FREE/LAMBDA FREE SER NEPH: 1.86 {RATIO} (ref 0.26–1.65)
LAMBDA LC FREE SERPL-MCNC: 1.75 MG/DL (ref 0.57–2.63)
PROT SERPL-MCNC: 6.6 G/DL (ref 6.4–8.2)

## 2023-07-03 PROCEDURE — 99213 OFFICE O/P EST LOW 20 MIN: CPT | Performed by: INTERNAL MEDICINE

## 2023-07-03 RX ORDER — PHENOL 1.4 %
1300 AEROSOL, SPRAY (ML) MUCOUS MEMBRANE
COMMUNITY

## 2023-07-26 ENCOUNTER — OFFICE VISIT (OUTPATIENT)
Dept: ENDOCRINOLOGY CLINIC | Facility: CLINIC | Age: 48
End: 2023-07-26

## 2023-07-26 VITALS
BODY MASS INDEX: 32.32 KG/M2 | WEIGHT: 194 LBS | HEIGHT: 65 IN | HEART RATE: 82 BPM | DIASTOLIC BLOOD PRESSURE: 67 MMHG | SYSTOLIC BLOOD PRESSURE: 97 MMHG

## 2023-07-26 DIAGNOSIS — R73.03 PREDIABETES: Primary | ICD-10-CM

## 2023-07-26 LAB
CARTRIDGE LOT#: ABNORMAL NUMERIC
GLUCOSE BLOOD: 96
HEMOGLOBIN A1C: 5.7 % (ref 4.3–5.6)
TEST STRIP LOT #: NORMAL NUMERIC

## 2023-07-26 PROCEDURE — 3008F BODY MASS INDEX DOCD: CPT

## 2023-07-26 PROCEDURE — 83036 HEMOGLOBIN GLYCOSYLATED A1C: CPT

## 2023-07-26 PROCEDURE — 3078F DIAST BP <80 MM HG: CPT

## 2023-07-26 PROCEDURE — 82947 ASSAY GLUCOSE BLOOD QUANT: CPT

## 2023-07-26 PROCEDURE — 99214 OFFICE O/P EST MOD 30 MIN: CPT

## 2023-07-26 PROCEDURE — 3074F SYST BP LT 130 MM HG: CPT

## 2023-07-26 RX ORDER — ESTRADIOL VALERATE 10 MG/ML
5 INJECTION INTRAMUSCULAR
Refills: 0 | COMMUNITY
Start: 2023-07-26

## 2023-07-26 RX ORDER — METFORMIN HYDROCHLORIDE 500 MG/1
500 TABLET, EXTENDED RELEASE ORAL 2 TIMES DAILY
Qty: 180 TABLET | Refills: 1 | Status: SHIPPED | OUTPATIENT
Start: 2023-07-26

## 2023-07-26 NOTE — PROGRESS NOTES
Name: Boni King  Date: 7/26/23    Referring Physician: No ref. provider found    HISTORY OF PRESENT ILLNESS     Boni King is a 52year old female who presents for follow up for prediabetes     Diabetes History:  Diagnosed- prediabetes about 5-6 years ago. Managed with lifestyle changes     FH DM  - mother- diet controlled   - father -diet controlled  -sister-DM type 2  -Sister- prediabetic     Prior glycohemoglobin were: 6.4% 9/2022; 5.9% 12/2022; 5.5% 3/2023; 5.7% POC today     Glucose in clinic today -72 mg/dl     Dietary compliance: Good- gluten intolerant and also following low CHO diet, limited fried foods   --> Did attend diabetes education classes around the time of prediabetes diagnosis      Recall:  Breakfast- oatmeal with sausage or fruit (blueberries), or eggs with sausage and occ. Grits   Lunch- tuna or salad or leftovers from dinner, chicken nuggets and fries on occasion - deep fryer  Dinner- spaghetti with chickpea noodles and broccoli salad, protein with vegetable   Snack- cashews a lot over the pandemic but has stopped, now not snacking as much- sometimes pistachios, occasional tortilla chips with hummus and with bell peppers    Beverages- water, tea and occasionally coffee    Exercise: Yes- walking three times weekly- 2 miles typically   Polyuria/polydipsia: No  Blurred vision: No     Episodes of hypoglycemia: No- sugar was 72 today in clinic but feels well. Blood Glucose:    - Not checking     Current DM Regimen:  Metformin 500mg PO BID    Modifying factors:  Medication adherence: Yes   Recent steroids, illness or infections: Yes- prednisone 10mg daily since 4/2023. Plan for steroid indefinitely right now. Had planned embryo transfer 5/2023 but estrogren was too elevated and now on oral steroid now in preparation for possible embryo transfer in August.     REVIEW OF SYSTEMS  Eyes: Diabetic retinopathy present:  Yes            Most recent visit to eye doctor in last 12 months: Yes CV: Cardiovascular disease present: No         Hypertension present: No         Hyperlipidemia present: No         Peripheral Vascular Disease present: No    : Nephropathy present: No    Neuro: Neuropathy present: No    Skin: Infection or ulceration: No    Osteoporosis: No    Thyroid disease: Yes- Hypothyroidism diagnosed about 15 years ago. Not currently maintained on thyroid hormone. Medications:     Current Outpatient Medications:     metFORMIN  MG Oral Tablet 24 Hr, Take 1 tablet (500 mg total) by mouth 2 (two) times daily. , Disp: , Rfl:     Calcium Carbonate 600 MG Oral Tab, Take 0.8667 tablets (1,300 mg total) by mouth., Disp: , Rfl:     Cyanocobalamin (VITAMIN B-12 CR OR), Take by mouth., Disp: , Rfl:     Blood Glucose Monitoring Suppl (ONETOUCH VERIO) w/Device Does not apply Kit, 1 each daily. , Disp: 1 kit, Rfl: 0    Glucose Blood (ONETOUCH VERIO) In Vitro Strip, Test 2x daily, Disp: 200 strip, Rfl: 0    OneTouch Delica Lancets 36R Does not apply Misc, Test 2 x daily, Disp: 200 each, Rfl: 0    hydroxychloroquine (PLAQUENIL) 200 MG Oral Tab, Take 1 tablet (200 mg total) by mouth in the morning and 1 tablet (200 mg total) before bedtime. , Disp: 60 tablet, Rfl: 0    tacrolimus 1 MG Oral Cap, Take 1 capsule (1 mg total) by mouth 2 (two) times daily. , Disp: 60 capsule, Rfl: 0    Cobalamin Combinations (MTX SUPPORT) Oral Tab, Take by mouth., Disp: , Rfl:     aspirin 81 MG Oral Tab EC, Take 1 tablet (81 mg total) by mouth daily. , Disp: , Rfl:     Omega-3 1000 MG Oral Cap, Take by mouth., Disp: , Rfl:     vitamin E 400 UNITS Oral Cap, Take 1,000 Units by mouth in the morning., Disp: , Rfl:     Cholecalciferol (VITAMIN D) 25 MCG (1000 UT) Oral Tab, As Directed. Taking 5,000 Units, Disp: , Rfl:     Prenatal 27-0.8 MG Oral Tab, Take 1 tablet by mouth daily. , Disp: , Rfl:     Probiotic Product (PROBIOTIC-10 ULTIMATE OR), Take by mouth., Disp: , Rfl:     albuterol 108 (90 Base) MCG/ACT Inhalation Aero Soln, Inhale 2 puffs into the lungs every 6 (six) hours as needed for Wheezing., Disp: 1 each, Rfl: 3    Levocetirizine Dihydrochloride 5 MG Oral Tab, Take 1 tablet (5 mg total) by mouth 2 (two) times daily. , Disp: , Rfl:      Allergies:     Gluten Meal             HIVES  Hydrocortisone          HIVES  Neosporin Original *    HIVES  Neosporin Pain Reli*    HIVES  Nickel                  HIVES    Comment:rash  Allegra [Fexofenadi*    HIVES  Gluten Flour              Neosporin Af [Micon*    HIVES  Nickel                      Comment:rash    Social History:   Social History    Socioeconomic History      Marital status: Single    Tobacco Use      Smoking status: Never      Smokeless tobacco: Never    Vaping Use      Vaping Use: Never used    Substance and Sexual Activity      Alcohol use: Yes        Comment: occasionally      Drug use: No      Medical History:   Past Medical History:   Diagnosis Date    Extrinsic asthma, unspecified     History of COVID-19 05/2023    RAGHAV (obstructive sleep apnea)     on a dental device       Surgical history:   Past Surgical History:   Procedure Laterality Date    OTHER  2020    fibroid removed; no associated bleeding complications    OTHER SURGICAL HISTORY      exercise induced,compartment syndrome         PHYSICAL EXAM   07/26/23  1021   BP: 97/67   Pulse: 82   Weight: 194 lb (88 kg)   Height: 5' 5\" (1.651 m)       General Appearance:  alert, well developed, in no acute distress  Eyes:  normal conjunctivae, sclera, and normal pupils  Neck: Trachea midline: Normal  Back: no kyphosis or back tenderness  Respiratory:  clear to auscultation bilaterally  Cardiovascular:  regular rate, rhythm, , no murmurs, S3 or S4  Lymph Nodes:  No abnormal nodes noted  Musculoskeletal:  normal muscle strength and tone  Skin:  normal moisture and skin texture  Hair & Nails:  normal scalp hair     Hematologic:  no excessive bruising  Neuro:  sensory grossly intact and motor grossly intact.   Psychiatric: oriented to time, self, and place  Nutritional:  no abnormal weight gain or loss      ASSESSMENT/PLAN:    Prediabetes   - HgA1c- 5.7%- stable and at goal for planned pregnancy.  -Congratulated patient on improved glycemic control   - Reviewed pathogenesis of diabetes and prediabetes   -Reviewed effect of steroids on glycemia- likely that prior steroid therapy contributing to hyperglycemia and worsening of HgA1c in the past year. - Reviewed importance of good glycemic control to prevent microvascular and macrovascular complications including nephropathy, neuropathy, retinopathy, and cardiovascular disease.  -Reviewed importance of good glycemic control to prevent progression to DM type 2 particularly given very strong family history   - Reviewed target glucose goals for patient  fasting and <160 post prandially   - Reviewed importance of following diabetic diet- recommended 135 grams of CHO per day or 45 grams per meal.   - Provided patient education materials    -Offered referral to [facility] for diabetes education classes but she feels confident with lifestyle changes and did attend classes within the last 5 years  -Has already made improvement in HgA1c with diet changes alone in the past year. -Reviewed medication options including Metformin and weekly GLP-1 therapy. Given plans for pregnancy will continue with Metformin therapy at this time. Is currently working with reproductive specialist.     -Discussed at length management of hyperglycemia in pregnancy with Metformin vs insulin therapy.     -Now back on oral prednisone but HgA1c stable with current dosing.   -Understands importance of contacting clinic with positive pregnancy test so that blood sugars can be managed accordingly    - Continue Metformin 500mg PO BID.  Reviewed side effects and risks vs benefits of medication.     -normotensive   -normal lipids 4/2023-levels at goal   -normal kidney function     RTC in 5-6 months   7/26/23  Yolanda Murillo SAMANTHA Guillermo    A total of 30 minutes was spent on obtaining history, reviewing pertinent imaging/labs and specialists notes, evaluating patient, providing multiple treatment options, reinforcing diet/exercise and compliance, and completing documentation.

## 2023-09-19 ENCOUNTER — OFFICE VISIT (OUTPATIENT)
Dept: FAMILY MEDICINE CLINIC | Facility: CLINIC | Age: 48
End: 2023-09-19

## 2023-09-19 VITALS
HEIGHT: 64 IN | WEIGHT: 190 LBS | BODY MASS INDEX: 32.44 KG/M2 | HEART RATE: 88 BPM | SYSTOLIC BLOOD PRESSURE: 118 MMHG | DIASTOLIC BLOOD PRESSURE: 72 MMHG | TEMPERATURE: 97 F | OXYGEN SATURATION: 99 %

## 2023-09-19 DIAGNOSIS — R73.03 PREDIABETES: ICD-10-CM

## 2023-09-19 DIAGNOSIS — Z3A.08 8 WEEKS GESTATION OF PREGNANCY: Primary | ICD-10-CM

## 2023-09-19 DIAGNOSIS — O26.851 SPOTTING AFFECTING PREGNANCY IN FIRST TRIMESTER: ICD-10-CM

## 2023-09-19 PROCEDURE — 3008F BODY MASS INDEX DOCD: CPT

## 2023-09-19 PROCEDURE — 3074F SYST BP LT 130 MM HG: CPT

## 2023-09-19 PROCEDURE — 3078F DIAST BP <80 MM HG: CPT

## 2023-09-19 PROCEDURE — 99213 OFFICE O/P EST LOW 20 MIN: CPT

## 2023-10-16 ENCOUNTER — TELEPHONE (OUTPATIENT)
Dept: FAMILY MEDICINE CLINIC | Facility: CLINIC | Age: 48
End: 2023-10-16

## 2023-10-16 NOTE — TELEPHONE ENCOUNTER
Left voice message for patient to contact our office so we can schedule a hospital follow up appointment.

## 2023-10-16 NOTE — TELEPHONE ENCOUNTER
Patient hospitalized 10/10 at [facility], needs TCM follow up if possible. Would prefer to only see Dr. Carson Ledbetter. Earliest non-tcm with Dr. Carson Ledbetter was 1/18/2024, placed on wait list with that appt date tenatively. Are we able to accommodate a TCM date range or closer to if possible?

## 2023-10-18 NOTE — TELEPHONE ENCOUNTER
Patient called back and was assisted with hospital follow up appt.     Future Appointments   Date Time Provider Doretha Mariah   10/26/2023 10:00 AM [facility]   12/15/2023  9:45 [facility]   1/18/2024  8:20 AM Onscot Funk DO [facility]  [address]   6/28/2024  9:00 AM Lissette Valdez MD [address] [place] [address] ONC EMO

## 2023-10-19 ENCOUNTER — LAB ENCOUNTER (OUTPATIENT)
Dept: LAB | Age: 48
End: 2023-10-19
Attending: OBSTETRICS & GYNECOLOGY
Payer: COMMERCIAL

## 2023-10-19 DIAGNOSIS — O03.9 COMPLETE MISCARRIAGE: Primary | ICD-10-CM

## 2023-10-19 LAB — B-HCG SERPL-ACNC: 962 MIU/ML

## 2023-10-19 PROCEDURE — 36415 COLL VENOUS BLD VENIPUNCTURE: CPT

## 2023-10-19 PROCEDURE — 84702 CHORIONIC GONADOTROPIN TEST: CPT

## 2023-10-26 ENCOUNTER — TELEPHONE (OUTPATIENT)
Dept: FAMILY MEDICINE CLINIC | Facility: CLINIC | Age: 48
End: 2023-10-26

## 2023-10-26 ENCOUNTER — OFFICE VISIT (OUTPATIENT)
Dept: FAMILY MEDICINE CLINIC | Facility: CLINIC | Age: 48
End: 2023-10-26

## 2023-10-26 ENCOUNTER — LAB ENCOUNTER (OUTPATIENT)
Dept: LAB | Age: 48
End: 2023-10-26
Attending: FAMILY MEDICINE

## 2023-10-26 VITALS
WEIGHT: 196 LBS | HEIGHT: 64 IN | TEMPERATURE: 98 F | DIASTOLIC BLOOD PRESSURE: 82 MMHG | OXYGEN SATURATION: 99 % | SYSTOLIC BLOOD PRESSURE: 111 MMHG | RESPIRATION RATE: 16 BRPM | BODY MASS INDEX: 33.46 KG/M2 | HEART RATE: 82 BPM

## 2023-10-26 DIAGNOSIS — O03.9 SPONTANEOUS MISCARRIAGE: ICD-10-CM

## 2023-10-26 DIAGNOSIS — M99.02 THORACIC REGION SOMATIC DYSFUNCTION: Primary | ICD-10-CM

## 2023-10-26 DIAGNOSIS — R76.8 POSITIVE ANA (ANTINUCLEAR ANTIBODY): ICD-10-CM

## 2023-10-26 DIAGNOSIS — J69.0 ASPIRATION PNEUMONIA, UNSPECIFIED ASPIRATION PNEUMONIA TYPE, UNSPECIFIED LATERALITY, UNSPECIFIED PART OF LUNG (HCC): ICD-10-CM

## 2023-10-26 LAB
BASOPHILS # BLD AUTO: 0.03 X10(3) UL (ref 0–0.2)
BASOPHILS NFR BLD AUTO: 0.6 %
DEPRECATED RDW RBC AUTO: 44.2 FL (ref 35.1–46.3)
EOSINOPHIL # BLD AUTO: 0.06 X10(3) UL (ref 0–0.7)
EOSINOPHIL NFR BLD AUTO: 1.2 %
ERYTHROCYTE [DISTWIDTH] IN BLOOD BY AUTOMATED COUNT: 12.7 % (ref 11–15)
HCT VFR BLD AUTO: 37.8 %
HGB BLD-MCNC: 12 G/DL
IMM GRANULOCYTES # BLD AUTO: 0.06 X10(3) UL (ref 0–1)
IMM GRANULOCYTES NFR BLD: 1.2 %
LYMPHOCYTES # BLD AUTO: 1.5 X10(3) UL (ref 1–4)
LYMPHOCYTES NFR BLD AUTO: 30.5 %
MCH RBC QN AUTO: 30.3 PG (ref 26–34)
MCHC RBC AUTO-ENTMCNC: 31.7 G/DL (ref 31–37)
MCV RBC AUTO: 95.5 FL
MONOCYTES # BLD AUTO: 0.74 X10(3) UL (ref 0.1–1)
MONOCYTES NFR BLD AUTO: 15.1 %
NEUTROPHILS # BLD AUTO: 2.52 X10 (3) UL (ref 1.5–7.7)
NEUTROPHILS # BLD AUTO: 2.52 X10(3) UL (ref 1.5–7.7)
NEUTROPHILS NFR BLD AUTO: 51.4 %
PLATELET # BLD AUTO: 357 10(3)UL (ref 150–450)
RBC # BLD AUTO: 3.96 X10(6)UL
WBC # BLD AUTO: 4.9 X10(3) UL (ref 4–11)

## 2023-10-26 PROCEDURE — 98925 OSTEOPATH MANJ 1-2 REGIONS: CPT | Performed by: FAMILY MEDICINE

## 2023-10-26 PROCEDURE — 99214 OFFICE O/P EST MOD 30 MIN: CPT | Performed by: FAMILY MEDICINE

## 2023-10-26 PROCEDURE — 36415 COLL VENOUS BLD VENIPUNCTURE: CPT

## 2023-10-26 PROCEDURE — 3074F SYST BP LT 130 MM HG: CPT | Performed by: FAMILY MEDICINE

## 2023-10-26 PROCEDURE — 3008F BODY MASS INDEX DOCD: CPT | Performed by: FAMILY MEDICINE

## 2023-10-26 PROCEDURE — 3079F DIAST BP 80-89 MM HG: CPT | Performed by: FAMILY MEDICINE

## 2023-10-26 PROCEDURE — 85025 COMPLETE CBC W/AUTO DIFF WBC: CPT

## 2023-10-26 RX ORDER — EVENING PRIMROSE OIL 500 MG
45 CAPSULE ORAL DAILY
COMMUNITY

## 2023-10-26 NOTE — TELEPHONE ENCOUNTER
Patient saw Dr. Barb Miller today for a hospital follow-up, but forgot to ask him when would be a good time for her to get the flu shot and Covid shot. Please either call Miley Zarate or send her a message through her [employer].

## 2023-10-26 NOTE — PATIENT INSTRUCTIONS
Medication reviewed and renewed where needed and appropriate. Comply with medications. Monitor blood pressures and record at home. Limit salt intake. Encouraged physical fitness and daily physical activity daily. Recommend weight loss via daily exercising and consistent healthy dietary changes. Patient may benefit from osteopathic manipulation in the thoracic region in order to relieve breastbone/rib joint discomfort.

## 2023-11-02 ENCOUNTER — TELEPHONE (OUTPATIENT)
Dept: OBGYN UNIT | Facility: HOSPITAL | Age: 48
End: 2023-11-02

## 2023-11-24 ENCOUNTER — NURSE TRIAGE (OUTPATIENT)
Dept: FAMILY MEDICINE CLINIC | Facility: CLINIC | Age: 48
End: 2023-11-24

## 2023-11-24 DIAGNOSIS — N76.0 BV (BACTERIAL VAGINOSIS): Primary | ICD-10-CM

## 2023-11-24 DIAGNOSIS — B96.89 BV (BACTERIAL VAGINOSIS): Primary | ICD-10-CM

## 2023-11-24 RX ORDER — METRONIDAZOLE 500 MG/1
500 TABLET ORAL 2 TIMES DAILY
Qty: 14 TABLET | Refills: 0 | Status: SHIPPED | OUTPATIENT
Start: 2023-11-24

## 2023-11-24 NOTE — TELEPHONE ENCOUNTER
Action Requested: Summary for Provider     []  Critical Lab, Recommendations Needed  [x] Need Additional Advice  []   FYI    []   Need Orders  [x] Need Medications Sent to Pharmacy  []  Other     SUMMARY: Patient stated that she had a miscarriage in October 2023 so didn't notice symptoms until the vaginal bleeding went away. Has been having vaginal discharge, vaginal itching and vaginal odor. No other symptoms. Patient was request diflucan. Please advise. No appointments available in [facility].     Reason for call: Vaginal Discharge (Vaginal discharge, itching, odor)  Onset: Oct 10, 2023    Reason for Disposition   Bad smelling vaginal discharge    Protocols used: Vaginal Narfwuchp-P-AL
Flagyl tablets have been sent to the pharmacy. Please let the patient know. Patient should also consume a lot of Cran raspberry juice in order to take the bitter taste out of her mouth as this is a common side effect with this medication. Thank you.
Pt contacted. Verified name and . Informed of Dr. Giovana Quiñonez message and verbalized understanding. Pt forgot to mention, she noticed her arms are also itching. , small red bumps after scratching. Pt asking if it is related to yeast infection. Pt states she went to the allergist for this, but did not find anything. Advised to apply lotion and keep skin moist, continue to monitor what food intake and see if rash worsens. Monitor rash for improvement after taking antibiotics. Pt verbalized understanding.
Acute on chronic systolic heart failure    Bipolar depression    CHF (congestive heart failure)    Chronic pain    COPD (chronic obstructive pulmonary disease)    CVA (cerebral vascular accident)    CVA (cerebral vascular accident)    Depression    Depression    Diabetes    DM (diabetes mellitus)    DVT (deep venous thrombosis)    HLD (hyperlipidemia)    HTN (hypertension)    Neuropathy    PAD (peripheral artery disease)

## 2023-12-11 ENCOUNTER — MED REC SCAN ONLY (OUTPATIENT)
Dept: FAMILY MEDICINE CLINIC | Facility: CLINIC | Age: 48
End: 2023-12-11

## 2023-12-15 ENCOUNTER — OFFICE VISIT (OUTPATIENT)
Dept: ENDOCRINOLOGY CLINIC | Facility: CLINIC | Age: 48
End: 2023-12-15

## 2023-12-15 VITALS
SYSTOLIC BLOOD PRESSURE: 120 MMHG | HEIGHT: 64.02 IN | WEIGHT: 202 LBS | HEART RATE: 83 BPM | DIASTOLIC BLOOD PRESSURE: 72 MMHG | BODY MASS INDEX: 34.49 KG/M2

## 2023-12-15 DIAGNOSIS — R73.03 PREDIABETES: Primary | ICD-10-CM

## 2023-12-15 LAB
CARTRIDGE LOT#: NORMAL NUMERIC
GLUCOSE BLOOD: 97
HEMOGLOBIN A1C: 5.3 % (ref 4.3–5.6)
TEST STRIP LOT #: NORMAL NUMERIC

## 2023-12-15 PROCEDURE — 99214 OFFICE O/P EST MOD 30 MIN: CPT

## 2023-12-15 PROCEDURE — 3074F SYST BP LT 130 MM HG: CPT

## 2023-12-15 PROCEDURE — 3078F DIAST BP <80 MM HG: CPT

## 2023-12-15 PROCEDURE — 83036 HEMOGLOBIN GLYCOSYLATED A1C: CPT

## 2023-12-15 PROCEDURE — 82947 ASSAY GLUCOSE BLOOD QUANT: CPT

## 2023-12-15 PROCEDURE — 3008F BODY MASS INDEX DOCD: CPT

## 2023-12-15 NOTE — PROGRESS NOTES
Name: Desirae Hanna  Date: 12/15/2023    Referring Physician: No ref. provider found    HISTORY OF PRESENT ILLNESS     Desirae Hanna is a 50year old female who presents for follow up for prediabetes. She has been undergoing IVF treatment. She did have positive pregnancy test 9/2023 but unfortunately ended in missed miscarriage 10/2023. She underwent D&C at [facility] and was hospitalized after for acute hypoxemia. Diabetes History:  Diagnosed- prediabetes about 5-6 years ago. Managed with lifestyle changes     FH DM  - mother- diet controlled   - father -diet controlled  -sister-DM type 2  -Sister- prediabetic     Prior glycohemoglobin were: 6.4% 9/2022; 5.9% 12/2022; 5.5% 3/2023; 5.7% 7/2023; 5.3% POC today     Glucose in clinic today- 97 mg/dl - ate apple and peanut butter     Dietary compliance: Good- gluten intolerant and also following low CHO diet, limited fried foods   --> Did attend diabetes education classes around the time of prediabetes diagnosis      Recall:  Breakfast- oatmeal with sausage or fruit (blueberries), or eggs with sausage and occ. Grits   Lunch- tuna or salad or leftovers from dinner, chicken nuggets and fries on occasion - deep fryer  Dinner- spaghetti with chickpea noodles and broccoli salad, protein with vegetable   Snack- cashews a lot over the pandemic but has stopped, now not snacking as much- sometimes pistachios, occasional tortilla chips with hummus and with bell peppers    Beverages- water, tea and occasionally coffee    Exercise: Yes- walking three times weekly- 2 miles typically - less walking in recent months.      Polyuria/polydipsia: No  Blurred vision: No     Episodes of hypoglycemia: None     Blood Glucose:    - Not checking     Current DM Regimen:  Metformin 500mg PO BID- on hold for right now per fertility specialist     Modifying factors:  Medication adherence: Yes   Recent steroids, illness or infections: Yes- prednisone 20mg around 2 months ago but now off steroid     REVIEW OF SYSTEMS  Eyes: Diabetic retinopathy present: Yes            Most recent visit to eye doctor in last 12 months: Yes - has upcoming appt tomorrow. CV: Cardiovascular disease present: No         Hypertension present: No         Hyperlipidemia present: No         Peripheral Vascular Disease present: No    : Nephropathy present: No    Neuro: Neuropathy present: No, denies symptoms     Skin: Infection or ulceration: No    Osteoporosis: No    Thyroid disease: Yes- Hypothyroidism diagnosed about 15 years ago. Not currently maintained on thyroid hormone. Medications:     Current Outpatient Medications:     metRONIDAZOLE 500 MG Oral Tab, Take 1 tablet (500 mg total) by mouth 2 (two) times daily. , Disp: 14 tablet, Rfl: 0    Vitamin E 45 MG (100 UNIT) Oral Cap, Take 45 mg by mouth daily. , Disp: , Rfl:     Omega-3 1000 MG Oral Cap, Take by mouth., Disp: , Rfl:     Cholecalciferol (VITAMIN D) 25 MCG (1000 UT) Oral Tab, As Directed. Taking 5,000 Units, Disp: , Rfl:     albuterol 108 (90 Base) MCG/ACT Inhalation Aero Soln, Inhale 2 puffs into the lungs every 6 (six) hours as needed for Wheezing., Disp: 1 each, Rfl: 3     Allergies:    Allergies   Allergen Reactions    Gluten Meal HIVES    Hydrocortisone HIVES    Neosporin Original [Neomycin-Bacitracin-Polymyxin] HIVES    Neosporin Pain Relieving HIVES    Nickel HIVES     rash      Allegra [Fexofenadine] HIVES    Gluten Flour     Neosporin Af [Miconazole] HIVES    Nickel      rash       Social History:   Social History     Socioeconomic History    Marital status: Single   Tobacco Use    Smoking status: Never    Smokeless tobacco: Never   Vaping Use    Vaping Use: Never used   Substance and Sexual Activity    Alcohol use: Not Currently     Comment: occasionally    Drug use: No       Medical History:   Past Medical History:   Diagnosis Date    Extrinsic asthma, unspecified     History of COVID-19 05/2023    RAGHAV (obstructive sleep apnea)     on a dental device       Surgical history:   Past Surgical History:   Procedure Laterality Date    OTHER  2020    fibroid removed; no associated bleeding complications    OTHER SURGICAL HISTORY      exercise induced,compartment syndrome         PHYSICAL EXAM  Vitals:    12/15/23 0944   BP: 120/72   Pulse: 83   Weight: 202 lb (91.6 kg)   Height: 5' 4.02\" (1.626 m)         General Appearance:  alert, well developed, in no acute distress  Eyes:  normal conjunctivae, sclera, and normal pupils  Neck: Trachea midline: Normal  Back: no kyphosis or back tenderness  Respiratory:  clear to auscultation bilaterally  Cardiovascular:  regular rate, rhythm, no murmurs, S3 or S4  Lymph Nodes:  No abnormal nodes noted  Musculoskeletal:  normal muscle strength and tone  Skin:  normal moisture and skin texture  Hair & Nails:  normal scalp hair     Hematologic:  no excessive bruising  Neuro:  sensory grossly intact and motor grossly intact. Psychiatric:  oriented to time, self, and place  Nutritional:  no abnormal weight gain or loss      ASSESSMENT/PLAN:    Prediabetes   - HgA1c- 5.3% - improved   -Congratulated patient on improved glycemic control   - Reviewed pathogenesis of diabetes and prediabetes   -Reviewed effect of steroids on glycemia- likely that prior steroid therapy contributing to hyperglycemia and worsening of HgA1c in the past year.    - Reviewed importance of good glycemic control to prevent microvascular and macrovascular complications including nephropathy, neuropathy, retinopathy, and cardiovascular disease.  -Reviewed importance of good glycemic control to prevent progression to DM type 2 particularly given very strong family history   - Reviewed target glucose goals for patient  fasting and <160 post prandially   - Reviewed importance of following diabetic diet- recommended 135 grams of CHO per day or 45 grams per meal.     - Provided patient education materials    -Offered referral to [facility] for diabetes education classes but she feels confident with lifestyle changes and did attend classes within the last 5 years  -Has already made improvement in HgA1c with diet changes alone in the past year. -Reviewed medication options including Metformin and weekly GLP-1 therapy. Given plans for pregnancy will continue with Metformin therapy at this time. Is currently working with reproductive specialist.     -Discussed at length management of hyperglycemia in pregnancy with Metformin vs insulin therapy.     -Now back on oral prednisone but HgA1c stable with current dosing.   -Understands importance of contacting clinic with positive pregnancy test so that blood sugars can be managed accordingly    - Ok to hold Metformin at this time per fertility group. Plans to restart when she resumes fertility treatment. HgA1c 5.3% so ok to hold at this time but agree should resume if resuming fertility treatments.     -normotensive   -normal lipids 4/2023-levels at goal, repeat at next visit   -normal kidney function  -Call clinic with positive pregnancy test as discussed. RTC in 6 months   12/15/2023  SAMANTHA Malin    A total of 30 minutes was spent on obtaining history, reviewing pertinent imaging/labs and specialists notes, evaluating patient, providing multiple treatment options, reinforcing diet/exercise and compliance, and completing documentation.

## 2023-12-22 ENCOUNTER — LAB ENCOUNTER (OUTPATIENT)
Dept: LAB | Age: 48
End: 2023-12-22
Attending: ALLERGY & IMMUNOLOGY
Payer: COMMERCIAL

## 2023-12-22 DIAGNOSIS — L29.8 OTHER PRURITUS: Primary | ICD-10-CM

## 2023-12-22 LAB
ALBUMIN SERPL-MCNC: 4 G/DL (ref 3.2–4.8)
ALP LIVER SERPL-CCNC: 56 U/L
ALT SERPL-CCNC: 22 U/L
ANION GAP SERPL CALC-SCNC: 7 MMOL/L (ref 0–18)
AST SERPL-CCNC: 31 U/L (ref ?–34)
BASOPHILS # BLD AUTO: 0.04 X10(3) UL (ref 0–0.2)
BASOPHILS NFR BLD AUTO: 1.2 %
BILIRUB DIRECT SERPL-MCNC: <0.1 MG/DL (ref ?–0.3)
BILIRUB SERPL-MCNC: 0.2 MG/DL (ref 0.3–1.2)
BUN BLD-MCNC: 6 MG/DL (ref 9–23)
BUN/CREAT SERPL: 6.3 (ref 10–20)
CALCIUM BLD-MCNC: 9.1 MG/DL (ref 8.7–10.4)
CHLORIDE SERPL-SCNC: 105 MMOL/L (ref 98–112)
CO2 SERPL-SCNC: 29 MMOL/L (ref 21–32)
CREAT BLD-MCNC: 0.95 MG/DL
DEPRECATED RDW RBC AUTO: 40.5 FL (ref 35.1–46.3)
EGFRCR SERPLBLD CKD-EPI 2021: 74 ML/MIN/1.73M2 (ref 60–?)
EOSINOPHIL # BLD AUTO: 0.14 X10(3) UL (ref 0–0.7)
EOSINOPHIL NFR BLD AUTO: 4.2 %
ERYTHROCYTE [DISTWIDTH] IN BLOOD BY AUTOMATED COUNT: 12.3 % (ref 11–15)
FASTING STATUS PATIENT QL REPORTED: YES
GLUCOSE BLD-MCNC: 94 MG/DL (ref 70–99)
HCT VFR BLD AUTO: 39 %
HGB BLD-MCNC: 12.5 G/DL
IMM GRANULOCYTES # BLD AUTO: 0.01 X10(3) UL (ref 0–1)
IMM GRANULOCYTES NFR BLD: 0.3 %
LYMPHOCYTES # BLD AUTO: 1.39 X10(3) UL (ref 1–4)
LYMPHOCYTES NFR BLD AUTO: 41.6 %
MCH RBC QN AUTO: 28.6 PG (ref 26–34)
MCHC RBC AUTO-ENTMCNC: 32.1 G/DL (ref 31–37)
MCV RBC AUTO: 89.2 FL
MONOCYTES # BLD AUTO: 0.48 X10(3) UL (ref 0.1–1)
MONOCYTES NFR BLD AUTO: 14.4 %
NEUTROPHILS # BLD AUTO: 1.28 X10 (3) UL (ref 1.5–7.7)
NEUTROPHILS # BLD AUTO: 1.28 X10(3) UL (ref 1.5–7.7)
NEUTROPHILS NFR BLD AUTO: 38.3 %
OSMOLALITY SERPL CALC.SUM OF ELEC: 289 MOSM/KG (ref 275–295)
PLATELET # BLD AUTO: 282 10(3)UL (ref 150–450)
POTASSIUM SERPL-SCNC: 3.5 MMOL/L (ref 3.5–5.1)
PROT SERPL-MCNC: 7.3 G/DL (ref 5.7–8.2)
RBC # BLD AUTO: 4.37 X10(6)UL
SODIUM SERPL-SCNC: 141 MMOL/L (ref 136–145)
TSI SER-ACNC: 1.16 MIU/ML (ref 0.55–4.78)
WBC # BLD AUTO: 3.3 X10(3) UL (ref 4–11)

## 2023-12-22 PROCEDURE — 80048 BASIC METABOLIC PNL TOTAL CA: CPT

## 2023-12-22 PROCEDURE — 80076 HEPATIC FUNCTION PANEL: CPT

## 2023-12-22 PROCEDURE — 84443 ASSAY THYROID STIM HORMONE: CPT

## 2023-12-22 PROCEDURE — 36415 COLL VENOUS BLD VENIPUNCTURE: CPT

## 2023-12-22 PROCEDURE — 83520 IMMUNOASSAY QUANT NOS NONAB: CPT

## 2023-12-22 PROCEDURE — 85025 COMPLETE CBC W/AUTO DIFF WBC: CPT

## 2023-12-24 LAB — TRYPTASE: 5.3 UG/L

## 2024-06-21 ENCOUNTER — OFFICE VISIT (OUTPATIENT)
Dept: ENDOCRINOLOGY CLINIC | Facility: CLINIC | Age: 49
End: 2024-06-21

## 2024-06-21 VITALS
WEIGHT: 216 LBS | SYSTOLIC BLOOD PRESSURE: 115 MMHG | BODY MASS INDEX: 36.88 KG/M2 | HEIGHT: 64.02 IN | DIASTOLIC BLOOD PRESSURE: 74 MMHG | HEART RATE: 99 BPM

## 2024-06-21 DIAGNOSIS — R73.03 PREDIABETES: Primary | ICD-10-CM

## 2024-06-21 LAB
GLUCOSE BLOOD: 115
HEMOGLOBIN A1C: 5.6 % (ref 4.3–5.6)
TEST STRIP LOT #: NORMAL NUMERIC

## 2024-06-21 PROCEDURE — 3008F BODY MASS INDEX DOCD: CPT

## 2024-06-21 PROCEDURE — 3074F SYST BP LT 130 MM HG: CPT

## 2024-06-21 PROCEDURE — 99213 OFFICE O/P EST LOW 20 MIN: CPT

## 2024-06-21 PROCEDURE — 82947 ASSAY GLUCOSE BLOOD QUANT: CPT

## 2024-06-21 PROCEDURE — 3078F DIAST BP <80 MM HG: CPT

## 2024-06-21 PROCEDURE — 83036 HEMOGLOBIN GLYCOSYLATED A1C: CPT

## 2024-06-21 NOTE — PROGRESS NOTES
Name: Kaur Richardson  Date: 6/21/24    Referring Physician: No ref. provider found    HISTORY OF PRESENT ILLNESS     Kaur Richardson is a 48 year old female who presents for follow up for prediabetes. She has been undergoing IVF treatment. She did have positive pregnancy test 9/2023 but unfortunately ended in missed miscarriage 10/2023. She underwent D&C at [facility] and was hospitalized after for acute hypoxemia.     Was off steroid for the last 9 weeks but may be restarting as she is hoping for IVF transfer. Is waiting for call back from fertility specialist but she states he generally recommends she be on prednisone 6 weeks prior to transfer.     She typically resumes Metformin while on prednisone and undergoing IVF transfer but does typically stop while off steroid.     Diabetes History:  Diagnosed- prediabetes about 5-6 years ago. Managed with lifestyle changes     FH DM  - mother- diet controlled   - father -diet controlled  -sister-DM type 2  -Sister- prediabetic     Prior glycohemoglobin were: 6.4% 9/2022; 5.9% 12/2022; 5.5% 3/2023; 5.7% 7/2023; 5.3% 12/2023; 5.6% POC today     Glucose in clinic today - 115 mg/dl     Dietary compliance: Good- gluten intolerant and also following low CHO diet, limited fried foods   --> Did attend diabetes education classes around the time of prediabetes diagnosis      Recall: snacking on more chocolate covered almonds- increased appetite with prednisone     Breakfast- oatmeal with sausage or fruit (blueberries), or eggs with sausage and occ. Grits   Lunch- tuna or salad or leftovers from dinner, chicken nuggets and fries on occasion - deep fryer  Dinner- spaghetti with chickpea noodles and broccoli salad, protein with vegetable   Snack- cashews a lot over the pandemic but has stopped, now not snacking as much- sometimes pistachios, occasional tortilla chips with hummus and with bell peppers    Beverages- water, tea and occasionally coffee    Exercise: Difficulty  exercise due to back pain.     Polyuria/polydipsia: No    Blurred vision: No     Episodes of hypoglycemia: None     Blood Glucose:  Checking infrequently    - 100's     Current DM Regimen:  Metformin 500mg PO BID- off medication while off steroid     Modifying factors:  Medication adherence: Yes   Recent steroids, illness or infections: Yes- prednisone 10 mg between 12/2023- 3/2023 but off for the last 9 weeks     REVIEW OF SYSTEMS  Eyes: Diabetic retinopathy present: No             Most recent visit to eye doctor in last 12 months: Yes - 12/2023    CV: Cardiovascular disease present: No         Hypertension present: No         Hyperlipidemia present: No         Peripheral Vascular Disease present: No    : Nephropathy present: No    Neuro: Neuropathy present: No, denies symptoms     Skin: Infection or ulceration: No    Osteoporosis: No    Thyroid disease: Yes- Hypothyroidism diagnosed about 15 years ago. Not currently maintained on thyroid hormone.       Medications:     Current Outpatient Medications:     metRONIDAZOLE 500 MG Oral Tab, Take 1 tablet (500 mg total) by mouth 2 (two) times daily., Disp: 14 tablet, Rfl: 0    Vitamin E 45 MG (100 UNIT) Oral Cap, Take 45 mg by mouth daily., Disp: , Rfl:     Omega-3 1000 MG Oral Cap, Take by mouth., Disp: , Rfl:     Cholecalciferol (VITAMIN D) 25 MCG (1000 UT) Oral Tab, As Directed. Taking 5,000 Units, Disp: , Rfl:     albuterol 108 (90 Base) MCG/ACT Inhalation Aero Soln, Inhale 2 puffs into the lungs every 6 (six) hours as needed for Wheezing., Disp: 1 each, Rfl: 3     Allergies:   Allergies   Allergen Reactions    Gluten Meal HIVES    Hydrocortisone HIVES    Neosporin Original [Neomycin-Bacitracin-Polymyxin] HIVES    Neosporin Pain Relieving HIVES    Nickel HIVES     rash      Allegra [Fexofenadine] HIVES    Gluten Flour     Neosporin Af [Miconazole] HIVES    Nickel      rash       Social History:   Social History     Socioeconomic History    Marital status: Single    Tobacco Use    Smoking status: Never    Smokeless tobacco: Never   Vaping Use    Vaping status: Never Used   Substance and Sexual Activity    Alcohol use: Not Currently     Comment: occasionally    Drug use: No       Medical History:   Past Medical History:    Extrinsic asthma, unspecified    History of COVID-19    RAGHAV (obstructive sleep apnea)    on a dental device       Surgical history:   Past Surgical History:   Procedure Laterality Date    Other  2020    fibroid removed; no associated bleeding complications    Other surgical history      exercise induced,compartment syndrome         PHYSICAL EXAM  Vitals:    06/21/24 0959   BP: 115/74   Pulse: 99   Weight: 216 lb (98 kg)   Height: 5' 4.02\" (1.626 m)         General Appearance:  alert, well developed, in no acute distress  Eyes:  normal conjunctivae, sclera, and normal pupils  Neck: Trachea midline: Normal  Back: no kyphosis or back tenderness  Respiratory:  clear to auscultation bilaterally  Cardiovascular:  regular rate, rhythm, no murmurs, S3 or S4  Lymph Nodes:  No abnormal nodes noted  Musculoskeletal:  normal muscle strength and tone  Skin:  normal moisture and skin texture  Hair & Nails:  normal scalp hair     Hematologic:  no excessive bruising  Neuro:  sensory grossly intact and motor grossly intact.  Psychiatric:  oriented to time, self, and place  Nutritional:  no abnormal weight gain or loss      ASSESSMENT/PLAN:    Prediabetes   - HgA1c- 5.6% - stable  - Reviewed pathogenesis of diabetes and prediabetes   -Reviewed effect of steroids on glycemia- likely that prior steroid therapy contributing to hyperglycemia and worsening of HgA1c in the past year.   - Reviewed importance of good glycemic control to prevent microvascular and macrovascular complications including nephropathy, neuropathy, retinopathy, and cardiovascular disease.  -Reviewed importance of good glycemic control to prevent progression to DM type 2 particularly given very strong family  history   - Reviewed target glucose goals for patient  fasting and <160 post prandially   - Reviewed importance of following diabetic diet- recommended 135 grams of CHO per day or 45 grams per meal.     - Provided patient education materials    -Offered referral to [facility] for diabetes education classes but she feels confident with lifestyle changes and did attend classes within the last 5 years  -Has already made improvement in HgA1c with diet changes alone in the past year.     -Reviewed medication options including Metformin and weekly GLP-1 therapy. Given plans for pregnancy will continue with Metformin therapy at this time. Is currently working with reproductive specialist.     -Discussed at length management of hyperglycemia in pregnancy with Metformin vs insulin therapy.     -Was back on oral prednisone for a few months after last visit but HgA1c stable- currently off prednisone.   -Understands importance of contacting clinic with positive pregnancy test so that blood sugars can be managed accordingly    - Ok to hold Metformin at this time per fertility group. Plans to restart when she resumes fertility treatment. HgA1c 5.6% so ok to hold at this time but agree should resume if resuming fertility treatments.     -normotensive   -normal lipids 4/2023- levels at goal, repeat at next visit   -normal kidney function  -Call clinic with positive pregnancy test as discussed.      RTC in 6 months   6/21/24  SAMANTHA Ayon    A total of 30 minutes was spent on obtaining history, reviewing pertinent imaging/labs and specialists notes, evaluating patient, providing multiple treatment options, reinforcing diet/exercise and compliance, and completing documentation.

## 2024-06-28 ENCOUNTER — OFFICE VISIT (OUTPATIENT)
Dept: HEMATOLOGY/ONCOLOGY | Facility: HOSPITAL | Age: 49
End: 2024-06-28
Attending: INTERNAL MEDICINE

## 2024-06-28 VITALS
BODY MASS INDEX: 36.7 KG/M2 | DIASTOLIC BLOOD PRESSURE: 71 MMHG | HEART RATE: 81 BPM | HEIGHT: 64.02 IN | WEIGHT: 215 LBS | TEMPERATURE: 98 F | OXYGEN SATURATION: 98 % | RESPIRATION RATE: 16 BRPM | SYSTOLIC BLOOD PRESSURE: 117 MMHG

## 2024-06-28 DIAGNOSIS — R76.8 ELEVATED SERUM IMMUNOGLOBULIN FREE LIGHT CHAIN LEVEL: ICD-10-CM

## 2024-06-28 DIAGNOSIS — D70.9 NEUTROPENIA, UNSPECIFIED TYPE (HCC): Primary | ICD-10-CM

## 2024-06-28 LAB
ALBUMIN SERPL-MCNC: 4.2 G/DL (ref 3.2–4.8)
ALBUMIN/GLOB SERPL: 1.2 {RATIO} (ref 1–2)
ALP LIVER SERPL-CCNC: 50 U/L
ALT SERPL-CCNC: 27 U/L
ANION GAP SERPL CALC-SCNC: 2 MMOL/L (ref 0–18)
AST SERPL-CCNC: 29 U/L (ref ?–34)
BASOPHILS # BLD AUTO: 0.03 X10(3) UL (ref 0–0.2)
BASOPHILS NFR BLD AUTO: 1 %
BILIRUB SERPL-MCNC: 0.3 MG/DL (ref 0.3–1.2)
BUN BLD-MCNC: 7 MG/DL (ref 9–23)
BUN/CREAT SERPL: 6.8 (ref 10–20)
CALCIUM BLD-MCNC: 9.4 MG/DL (ref 8.7–10.4)
CHLORIDE SERPL-SCNC: 108 MMOL/L (ref 98–112)
CO2 SERPL-SCNC: 30 MMOL/L (ref 21–32)
CREAT BLD-MCNC: 1.03 MG/DL
DEPRECATED RDW RBC AUTO: 40.1 FL (ref 35.1–46.3)
EGFRCR SERPLBLD CKD-EPI 2021: 67 ML/MIN/1.73M2 (ref 60–?)
EOSINOPHIL # BLD AUTO: 0.16 X10(3) UL (ref 0–0.7)
EOSINOPHIL NFR BLD AUTO: 5.2 %
ERYTHROCYTE [DISTWIDTH] IN BLOOD BY AUTOMATED COUNT: 11.7 % (ref 11–15)
GLOBULIN PLAS-MCNC: 3.6 G/DL (ref 2–3.5)
GLUCOSE BLD-MCNC: 99 MG/DL (ref 70–99)
HCT VFR BLD AUTO: 41.1 %
HGB BLD-MCNC: 14 G/DL
IGA SERPL-MCNC: <33 MG/DL (ref 40–350)
IGM SERPL-MCNC: 140.1 MG/DL (ref 50–300)
IMM GRANULOCYTES # BLD AUTO: 0.01 X10(3) UL (ref 0–1)
IMM GRANULOCYTES NFR BLD: 0.3 %
IMMUNOGLOBULIN PNL SER-MCNC: 2169 MG/DL (ref 650–1600)
LYMPHOCYTES # BLD AUTO: 1.38 X10(3) UL (ref 1–4)
LYMPHOCYTES NFR BLD AUTO: 45 %
MCH RBC QN AUTO: 31.6 PG (ref 26–34)
MCHC RBC AUTO-ENTMCNC: 34.1 G/DL (ref 31–37)
MCV RBC AUTO: 92.8 FL
MONOCYTES # BLD AUTO: 0.33 X10(3) UL (ref 0.1–1)
MONOCYTES NFR BLD AUTO: 10.7 %
NEUTROPHILS # BLD AUTO: 1.16 X10 (3) UL (ref 1.5–7.7)
NEUTROPHILS # BLD AUTO: 1.16 X10(3) UL (ref 1.5–7.7)
NEUTROPHILS NFR BLD AUTO: 37.8 %
OSMOLALITY SERPL CALC.SUM OF ELEC: 288 MOSM/KG (ref 275–295)
PLATELET # BLD AUTO: 268 10(3)UL (ref 150–450)
POTASSIUM SERPL-SCNC: 3.9 MMOL/L (ref 3.5–5.1)
PROT SERPL-MCNC: 7.8 G/DL (ref 5.7–8.2)
PROT UR-MCNC: 10.6 MG/DL (ref ?–14)
RBC # BLD AUTO: 4.43 X10(6)UL
SODIUM SERPL-SCNC: 140 MMOL/L (ref 136–145)
WBC # BLD AUTO: 3.1 X10(3) UL (ref 4–11)

## 2024-06-28 PROCEDURE — 99214 OFFICE O/P EST MOD 30 MIN: CPT | Performed by: INTERNAL MEDICINE

## 2024-06-28 RX ORDER — PREDNISONE 10 MG/1
15 TABLET ORAL DAILY
COMMUNITY

## 2024-06-28 RX ORDER — MULTIVIT-MIN/IRON FUM/FOLIC AC 7.5 MG-4
1 TABLET ORAL DAILY
COMMUNITY

## 2024-06-28 RX ORDER — ASPIRIN 81 MG/1
81 TABLET ORAL DAILY
COMMUNITY

## 2024-06-28 RX ORDER — TACROLIMUS 1 MG/1
1 CAPSULE ORAL 3 TIMES DAILY
COMMUNITY

## 2024-06-28 RX ORDER — ZINC OXIDE 20 %
OINTMENT (GRAM) TOPICAL AS NEEDED
COMMUNITY

## 2024-06-28 RX ORDER — ASCORBIC ACID 500 MG
500 TABLET ORAL DAILY
COMMUNITY

## 2024-06-28 RX ORDER — MAGNESIUM OXIDE 400 MG (241.3 MG MAGNESIUM) TABLET
500 TABLET DAILY
COMMUNITY

## 2024-06-28 RX ORDER — HYDROXYCHLOROQUINE SULFATE 200 MG/1
200 TABLET, FILM COATED ORAL 2 TIMES DAILY
COMMUNITY

## 2024-06-28 NOTE — PROGRESS NOTES
Hematology Progress Note    Patient Name: Kaur Richardson   YOB: 1975   Medical Record Number: [id number]   CSN: [id number]   Consulting Physician: Richard Acosta MD  Referring Physician(s): No ref. provider found  Date of Visit: 6/28/2024     Chief Complaint:  Elevated serum free light chain level    History of Present Illness:     Kaur Richardson is a 48 year old female that was seen today in the hematology suite for evaluation of the above condition. Patient has PMH relevant for positive MARIBELL results in the past who follows with a rheumatologist for a possible autoimmune issue that is not classified presents for an evaluation of an elevated IgG level in the setting of a lowered IgA level. Testing was prompted and ordered by her Ob/Gyn provider in [place] who is a fertility specialist.  Patient is attempting to get pregnant and mentions her OB/GYN provider feels there is an infection with possible inflammation in the endometrium which may also be contributing to elevated immunoglobulin levels. Kaur reports being in her usual state of health over the last 6 months to 1 year.  She denies systemic signs of illness. Her review of systems is negative for chest pain, dyspnea, nausea, vomiting, abdominal pain, no change in bowel or bladder habits, no change in skin, the development of rash with no neurological symptoms endorsed.    Interval History:  Patient returns for follow-up of elevated kappa light chain with mildly increased serum free light chain ratio.  Reports being in her usual state of health over the last yr. 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 Her evaluation with bone marrow biopsy was to confirm that she did not have a plasma cell dyscrasia as the pt has been working on fertility efforts to try to become pregnant. The patient was able to get pregnant last yr, but had a miscarriage in 10/23 followed  by D&C with post procedure complications with anesthesia.  Patient continues to work on the fertility process with her reproductive rheumatologist. She denies systemic signs of illness or abnormal bleeding.  Review of systems negative for any other concerns.    Past Medical History:  Past Medical History:    Extrinsic asthma, unspecified    History of COVID-19    RAGHAV (obstructive sleep apnea)    on a dental device       Past Surgical History:  Past Surgical History:   Procedure Laterality Date    Other  2020    fibroid removed; no associated bleeding complications    Other surgical history      exercise induced,compartment syndrome       Family Medical History:  Family History   Problem Relation Age of Onset    Cancer Father 82        Bladder Cancer    Bleeding Disorders Neg     DVT/VTE Neg        Social History:  Social History     Socioeconomic History    Marital status: Single     Spouse name: Not on file    Number of children: Not on file    Years of education: Not on file    Highest education level: Not on file   Occupational History    Not on file   Tobacco Use    Smoking status: Never    Smokeless tobacco: Never   Vaping Use    Vaping status: Never Used   Substance and Sexual Activity    Alcohol use: Not Currently     Comment: occasionally    Drug use: No    Sexual activity: Not on file   Other Topics Concern    Not on file   Social History Narrative    Kaur is single. She has no children. 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 She lives alone in [place], [place].     Social Determinants of Health     Financial Resource Strain: Not on file   Food Insecurity: Low Risk  (10/10/2023)    [facility]    Food Insecurity     Have there been times that your food ran out, and you didn't have money to get more?: No     Are there times that you worry that this might happen?: No   Transportation Needs: Low Risk  (10/10/2023)     [facility]    Transportation Needs     Do you have trouble getting transportation to medical appointments?: No     How do you normally get to and from your appointments?: Not on file   Physical Activity: Not on file   Stress: Not on file   Social Connections: Unknown (3/13/2021)    [facility]    Social Connections     Conversations with friends/family/neighbors per week: Not on file   Housing Stability: Not on file (10/10/2023)       Allergies:   Allergies   Allergen Reactions    Gluten Meal HIVES    Hydrocortisone HIVES    Neosporin Original [Neomycin-Bacitracin-Polymyxin] HIVES    Neosporin Pain Relieving HIVES    Nickel HIVES     rash      Allegra [Fexofenadine] HIVES    Gluten Flour     Neosporin Af [Miconazole] HIVES    Nickel      rash       Current Medications:   Vitamin C 500 MG Oral Tab Take 1 tablet (500 mg total) by mouth daily.      Bacillus Coagulans-Inulin (ALIGN PREBIOTIC-PROBIOTIC OR) Take by mouth daily.      aspirin 81 MG Oral Tab EC Take 1 tablet (81 mg total) by mouth daily.      zinc oxide 20 % External Ointment Apply topically as needed for Dry Skin.      cyanocobalamin 500 MCG Oral Tab Take 1 tablet (500 mcg total) by mouth daily.      predniSONE 10 MG Oral Tab Take 1.5 tablets (15 mg total) by mouth daily.      hydroxychloroquine 200 MG Oral Tab Take 1 tablet (200 mg total) by mouth 2 (two) times daily. Will start in 2 weeks      tacrolimus 1 MG Oral Cap Take 1 capsule (1 mg total) by mouth in the morning, at noon, and at bedtime. Will start in 2 weeks      Multiple Vitamins-Minerals (MULTI-VITAMIN/MINERALS) Oral Tab Take 1 tablet by mouth daily. M.T.X. supplement per patient      Vitamin E 45 MG (100 UNIT) Oral Cap Take 45 mg by mouth daily.      Omega-3 1000 MG Oral Cap Take by mouth.      Cholecalciferol (VITAMIN D) 25 MCG (1000 UT) Oral Tab As  Directed. Taking 5,000 Units      albuterol 108 (90 Base) MCG/ACT Inhalation Aero Soln Inhale 2 puffs into the lungs every 6 (six) hours as needed for Wheezing. 1 each 3       Review of Systems:    Constitutional: Negative for anorexia, chills, fatigue, fevers, night sweats and weight loss.  Eyes: Negative for visual disturbance, irritation and redness.  Respiratory: Negative for cough, hemoptysis, chest pain, or dyspnea on exertion.  Gastrointestinal: Negative for dysphagia, odynophagia, reflux symptoms, nausea, vomiting, change in bowel habits, diarrhea, constipation and abdominal pain.  Integument/breast: Negative for rash, skin lesions, and pruritus.  Hematologic/lymphatic: Negative for easy bruising, bleeding, and lymphadenopathy.  Musculoskeletal: Negative for myalgias, arthralgias, muscle weakness.  Neurological: Negative for headaches, dizziness, speech problems, gait problems and weakness.    A comprehensive 14 point review of systems was completed.  Pertinent positives and negatives noted in the the HPI.     Vital Signs:  /71 (BP Location: Left arm, Patient Position: Sitting, Cuff Size: large)   Pulse 81   Temp 98.2 °F (36.8 °C) (Oral)   Resp 16   Ht 1.626 m (5' 4.02\")   Wt 97.5 kg (215 lb)   LMP  (LMP Unknown)   SpO2 98%   BMI 36.88 kg/m²     Physical Examination:    General: Patient is alert and oriented x 3, not in acute distress.  Psych:  Friendly, cooperative with appropriate questions and responses  HEENT: EOMs intact. Oropharynx is clear.   Neck:  No palpable lymphadenopathy. Neck is supple.  Lymphatics: There is no palpable lymphadenopathy throughout in the cervical, supraclavicular, axillary, or inguinal regions.  Chest: Clear to auscultation. No wheezes or rales.  Heart: Regular rate and rhythm. S1S2 normal.  Abdomen: Soft, non tender with good bowel sounds.  No hepatosplenomegaly.  No palpable mass.  Extremities: No edema or calf tenderness.  Neurological: Grossly intact.       Labs:    Lab Results   Component Value Date/Time    WBC 3.1 (L) 06/28/2024 09:12 AM    RBC 4.43 06/28/2024 09:12 AM    HGB 14.0 06/28/2024 09:12 AM    HCT 41.1 06/28/2024 09:12 AM    MCV 92.8 06/28/2024 09:12 AM    MCH 31.6 06/28/2024 09:12 AM    MCHC 34.1 06/28/2024 09:12 AM    RDW 11.7 06/28/2024 09:12 AM    NEPRELIM 1.16 (L) 06/28/2024 09:12 AM    .0 06/28/2024 09:12 AM       Lab Results   Component Value Date/Time    GLU 94 12/22/2023 11:01 AM    BUN 6 (L) 12/22/2023 11:01 AM    CREATSERUM 0.95 12/22/2023 11:01 AM    GFRNAA 73 04/19/2022 10:57 AM    CA 9.1 12/22/2023 11:01 AM    ALB 4.0 12/22/2023 11:01 AM     12/22/2023 11:01 AM    K 3.5 12/22/2023 11:01 AM     12/22/2023 11:01 AM    CO2 29.0 12/22/2023 11:01 AM    ALKPHO 56 12/22/2023 11:01 AM    AST 31 12/22/2023 11:01 AM    ALT 22 12/22/2023 11:01 AM         Impression:      ICD-10-CM    1. Neutropenia, unspecified type (HCC)  D70.9       2. Elevated serum immunoglobulin free light chain level  R76.8 CBC W/DIFF [E]     COMP METABOLIC PANEL [E]     Protein Electrophoresis w/ DOM & IgA,IgG, IgM Serum [E]     Bence Porter Protein, Random Urine Panel          48 year old W with PMH relevant for positive MARIBELL results in the past who follows with a rheumatologist for a possible autoimmune issue that is not classified presents for an evaluation of an elevated serum free light chain level with elevated serum free light chain ratio    Plan:    1) Elevated serum free light chain level    --discussed with patient that we should complete an evaluation for possible MGUS  --she denies a history suggestive of common variable immunodeficiency (CVID) as she is without recurrent infections despite the lowered IgA level  --pt has not had a blood transfusion in the past as some patients with IgA deficiency can have anaphylactic reactions to blood transfusions  --she was told that she has an infection+inflammation of the endometrium,so perhaps these lab  elevations are in innate immune response to infection   --we discussed MGUS and its potential to lead to plasma cell dyscrasias like multiple myeloma as 1% risk per yr    --Even if an MGUS diagnosis is made, discussed that I do not suspect this would impact her ability to get pregnant or have a baby.     --I called the patient regarding her bone marrow biopsy results from 1/27/2023. We reviewed her bone marrow biopsy shows no evidence of lymphoma, plasma cell neoplasm/MGUS or hematologic cancer or nonhematologic neoplastic process/cancer    --In 7/2023, I have reviewed her most lab tests at that time when she was noted to have some mild renal impairment with creatinine 1.04 possibly from dehydration.  Kappa light chain has decreased but still elevated at 3.254.  Kappa free light chain ratio slightly increased at 1.86, but again lowered from prior values.  This increase in serum free light chain ratio might be expected in a person with mild renal impairment. She had a mild MEJIA at time.    --discussed with pt that these lab elevations may be from her inflammatory condition or possibly due to a Light Chain MGUS with a risk for myeloma development of 1%/yr since there was a mild inc in SFLC. We dicussed we could monitor with a repeat eval in 1 yr. I discussed with patient I think is important for her to discuss this possibility of MGUS with her fertility provider who is based on [place]    --Active myeloma that requires tx involves hypercalcemia, renal sufficiency with values greater than 2 points above the upper limit of normal, anemia or bone lytic lesions; none of which were present at that time    --pt returns for planned 1 yr f/u of elevated light chain levels, no systemic signs of illness. Repeat labs in process, if these still show features c/w an inflammatory condition, pt does not need routine f/u in Heme and can RTC in the future as needed    2.) Neutropenia    --an expected feature in persons with autoimmune  conditions, can also result from immunosuppression therapy treatment  --pt has been w/o recurrent infections over the last yr; this can be monitored by her PCP and/or reproductive Rheumatologist and she can RTC in the future as needed    MDM: Too Acosta MD  [facility]  [address] [address]

## 2024-07-01 LAB
KAPPA LC FREE SER-MCNC: 4.72 MG/DL (ref 0.33–1.94)
KAPPA LC FREE/LAMBDA FREE SER NEPH: 2.09 {RATIO} (ref 0.26–1.65)
LAMBDA LC FREE SERPL-MCNC: 2.26 MG/DL (ref 0.57–2.63)

## 2024-07-02 LAB
ALBUMIN SERPL ELPH-MCNC: 4.96 G/DL (ref 3.75–5.21)
ALBUMIN/GLOB SERPL: 1.95 {RATIO} (ref 1–2)
ALPHA1 GLOB SERPL ELPH-MCNC: 0.24 G/DL (ref 0.19–0.46)
ALPHA2 GLOB SERPL ELPH-MCNC: 0.62 G/DL (ref 0.48–1.05)
B-GLOBULIN SERPL ELPH-MCNC: 0.14 G/DL (ref 0.68–1.23)
GAMMA GLOB SERPL ELPH-MCNC: 1.55 G/DL (ref 0.62–1.7)
PROT SERPL-MCNC: 7.5 G/DL (ref 5.7–8.2)

## 2024-08-29 ENCOUNTER — LAB ENCOUNTER (OUTPATIENT)
Dept: LAB | Age: 49
End: 2024-08-29
Attending: FAMILY MEDICINE
Payer: COMMERCIAL

## 2024-08-29 ENCOUNTER — OFFICE VISIT (OUTPATIENT)
Dept: FAMILY MEDICINE CLINIC | Facility: CLINIC | Age: 49
End: 2024-08-29
Payer: COMMERCIAL

## 2024-08-29 VITALS
WEIGHT: 213 LBS | RESPIRATION RATE: 18 BRPM | TEMPERATURE: 98 F | DIASTOLIC BLOOD PRESSURE: 78 MMHG | OXYGEN SATURATION: 98 % | HEART RATE: 80 BPM | BODY MASS INDEX: 36.37 KG/M2 | SYSTOLIC BLOOD PRESSURE: 115 MMHG | HEIGHT: 64 IN

## 2024-08-29 DIAGNOSIS — Z00.00 HEALTHY ADULT ON ROUTINE PHYSICAL EXAMINATION: ICD-10-CM

## 2024-08-29 DIAGNOSIS — Z31.83 PATIENT UNDERGOING IN VITRO FERTILIZATION: ICD-10-CM

## 2024-08-29 DIAGNOSIS — Z12.31 ENCOUNTER FOR SCREENING MAMMOGRAM FOR BREAST CANCER: Primary | ICD-10-CM

## 2024-08-29 DIAGNOSIS — E66.9 CLASS 2 OBESITY WITHOUT SERIOUS COMORBIDITY WITH BODY MASS INDEX (BMI) OF 36.0 TO 36.9 IN ADULT, UNSPECIFIED OBESITY TYPE: ICD-10-CM

## 2024-08-29 DIAGNOSIS — Z28.21 PNEUMOCOCCAL VACCINATION DECLINED BY PATIENT: ICD-10-CM

## 2024-08-29 DIAGNOSIS — Z28.21 VARICELLA ZOSTER VIRUS (VZV) VACCINATION DECLINED: ICD-10-CM

## 2024-08-29 LAB
ALBUMIN SERPL-MCNC: 4.1 G/DL (ref 3.2–4.8)
ALBUMIN/GLOB SERPL: 1.3 {RATIO} (ref 1–2)
ALP LIVER SERPL-CCNC: 43 U/L
ALT SERPL-CCNC: 19 U/L
ANION GAP SERPL CALC-SCNC: 6 MMOL/L (ref 0–18)
AST SERPL-CCNC: 22 U/L (ref ?–34)
ATRIAL RATE: 74 BPM
BASOPHILS # BLD AUTO: 0.05 X10(3) UL (ref 0–0.2)
BASOPHILS NFR BLD AUTO: 0.7 %
BILIRUB SERPL-MCNC: 0.5 MG/DL (ref 0.3–1.2)
BILIRUB UR QL: NEGATIVE
BUN BLD-MCNC: 14 MG/DL (ref 9–23)
BUN/CREAT SERPL: 13.3 (ref 10–20)
CALCIUM BLD-MCNC: 9.7 MG/DL (ref 8.7–10.4)
CHLORIDE SERPL-SCNC: 109 MMOL/L (ref 98–112)
CLARITY UR: CLEAR
CO2 SERPL-SCNC: 27 MMOL/L (ref 21–32)
CREAT BLD-MCNC: 1.05 MG/DL
DEPRECATED RDW RBC AUTO: 47.8 FL (ref 35.1–46.3)
EGFRCR SERPLBLD CKD-EPI 2021: 66 ML/MIN/1.73M2 (ref 60–?)
EOSINOPHIL # BLD AUTO: 0.08 X10(3) UL (ref 0–0.7)
EOSINOPHIL NFR BLD AUTO: 1.1 %
ERYTHROCYTE [DISTWIDTH] IN BLOOD BY AUTOMATED COUNT: 13.6 % (ref 11–15)
FASTING STATUS PATIENT QL REPORTED: YES
GLOBULIN PLAS-MCNC: 3.1 G/DL (ref 2–3.5)
GLUCOSE BLD-MCNC: 76 MG/DL (ref 70–99)
GLUCOSE UR-MCNC: NORMAL MG/DL
HAV AB SER QL IA: NONREACTIVE
HBV CORE AB SERPL QL IA: NONREACTIVE
HBV SURFACE AB SER QL: NONREACTIVE
HBV SURFACE AB SERPL IA-ACNC: <3.1 MIU/ML
HBV SURFACE AG SERPL QL IA: NONREACTIVE
HCT VFR BLD AUTO: 43 %
HCV AB SERPL QL IA: NONREACTIVE
HGB BLD-MCNC: 13.7 G/DL
IMM GRANULOCYTES # BLD AUTO: 0.01 X10(3) UL (ref 0–1)
IMM GRANULOCYTES NFR BLD: 0.1 %
KETONES UR-MCNC: NEGATIVE MG/DL
LEUKOCYTE ESTERASE UR QL STRIP.AUTO: NEGATIVE
LYMPHOCYTES # BLD AUTO: 2.41 X10(3) UL (ref 1–4)
LYMPHOCYTES NFR BLD AUTO: 34.3 %
MCH RBC QN AUTO: 30.4 PG (ref 26–34)
MCHC RBC AUTO-ENTMCNC: 31.9 G/DL (ref 31–37)
MCV RBC AUTO: 95.3 FL
MONOCYTES # BLD AUTO: 0.63 X10(3) UL (ref 0.1–1)
MONOCYTES NFR BLD AUTO: 9 %
NEUTROPHILS # BLD AUTO: 3.84 X10 (3) UL (ref 1.5–7.7)
NEUTROPHILS # BLD AUTO: 3.84 X10(3) UL (ref 1.5–7.7)
NEUTROPHILS NFR BLD AUTO: 54.8 %
NITRITE UR QL STRIP.AUTO: NEGATIVE
OSMOLALITY SERPL CALC.SUM OF ELEC: 293 MOSM/KG (ref 275–295)
P AXIS: 56 DEGREES
P-R INTERVAL: 150 MS
PH UR: 6 [PH] (ref 5–8)
PLATELET # BLD AUTO: 299 10(3)UL (ref 150–450)
POTASSIUM SERPL-SCNC: 3.7 MMOL/L (ref 3.5–5.1)
PROLACTIN SERPL-MCNC: 6.6 NG/ML
PROT SERPL-MCNC: 7.2 G/DL (ref 5.7–8.2)
PROT UR-MCNC: NEGATIVE MG/DL
Q-T INTERVAL: 392 MS
QRS DURATION: 78 MS
QTC CALCULATION (BEZET): 435 MS
R AXIS: -8 DEGREES
RBC # BLD AUTO: 4.51 X10(6)UL
SODIUM SERPL-SCNC: 142 MMOL/L (ref 136–145)
SP GR UR STRIP: 1.02 (ref 1–1.03)
T AXIS: 20 DEGREES
TSI SER-ACNC: 1.46 MIU/ML (ref 0.55–4.78)
UROBILINOGEN UR STRIP-ACNC: NORMAL
VENTRICULAR RATE: 74 BPM
WBC # BLD AUTO: 7 X10(3) UL (ref 4–11)

## 2024-08-29 PROCEDURE — 99213 OFFICE O/P EST LOW 20 MIN: CPT | Performed by: FAMILY MEDICINE

## 2024-08-29 PROCEDURE — 3074F SYST BP LT 130 MM HG: CPT | Performed by: FAMILY MEDICINE

## 2024-08-29 PROCEDURE — 93000 ELECTROCARDIOGRAM COMPLETE: CPT | Performed by: FAMILY MEDICINE

## 2024-08-29 PROCEDURE — 99396 PREV VISIT EST AGE 40-64: CPT | Performed by: FAMILY MEDICINE

## 2024-08-29 PROCEDURE — 3078F DIAST BP <80 MM HG: CPT | Performed by: FAMILY MEDICINE

## 2024-08-29 PROCEDURE — 3008F BODY MASS INDEX DOCD: CPT | Performed by: FAMILY MEDICINE

## 2024-08-29 NOTE — PATIENT INSTRUCTIONS
All adult screening ordered and done appropriate for patient's age and gender and risk factors and complaints.  Recommend weight loss via daily exercising and consistent healthy dietary changes.  Encouraged physical fitness and daily physical activity daily.  Comply with medications.

## 2024-08-30 NOTE — PROGRESS NOTES
Subjective:     Patient ID: Kaur Richardson is a 48 year old female.    This patient is a 48-year-old -American female  here for complete preventive care physical and for status update on any confirmed chronic medical illnesses and follow up on any previous labs or procedures that were suggestive or in need of further work up. Colonoscopy is current. Bowel, bladder, and sexual function are intact.    Patient eligible for pneumococcal and shingles vaccine, but declines for now.    Patient is still seeking in vitro fertilization.  Next transfer is upcoming in the month of September 2024.  The patient needs a number of labs in order to be cleared for this transfer.    Patient's Pap smear and other pelvic exams are currently being managed by her fertilization team.        History/Other:   Review of Systems  Current Outpatient Medications   Medication Sig Dispense Refill    aspirin 81 MG Oral Tab EC Take 1 tablet (81 mg total) by mouth daily.      Vitamin C 500 MG Oral Tab Take 1 tablet (500 mg total) by mouth daily.      Bacillus Coagulans-Inulin (ALIGN PREBIOTIC-PROBIOTIC OR) Take by mouth daily.      zinc oxide 20 % External Ointment Apply topically as needed for Dry Skin.      cyanocobalamin 500 MCG Oral Tab Take 1 tablet (500 mcg total) by mouth daily.      predniSONE 10 MG Oral Tab Take 1.5 tablets (15 mg total) by mouth daily.      hydroxychloroquine 200 MG Oral Tab Take 1 tablet (200 mg total) by mouth 2 (two) times daily. Will start in 2 weeks      tacrolimus 1 MG Oral Cap Take 1 capsule (1 mg total) by mouth in the morning, at noon, and at bedtime. Will start in 2 weeks      Multiple Vitamins-Minerals (MULTI-VITAMIN/MINERALS) Oral Tab Take 1 tablet by mouth daily. M.T.X. supplement per patient      Vitamin E 45 MG (100 UNIT) Oral Cap Take 45 mg by mouth daily.      Omega-3 1000 MG Oral Cap Take by mouth.      Cholecalciferol (VITAMIN D) 25 MCG (1000 UT) Oral Tab As Directed. Taking 5,000 Units       albuterol 108 (90 Base) MCG/ACT Inhalation Aero Soln Inhale 2 puffs into the lungs every 6 (six) hours as needed for Wheezing. 1 each 3     Allergies:  Allergies   Allergen Reactions    Gluten Meal HIVES    Hydrocortisone HIVES    Neosporin Original [Neomycin-Bacitracin-Polymyxin] HIVES    Neosporin Pain Relieving HIVES    Nickel HIVES     rash      Allegra [Fexofenadine] HIVES    Gluten Flour     Neosporin Af [Miconazole] HIVES    Nickel      rash       Past Medical History:    Extrinsic asthma, unspecified    History of COVID-19    RAGHAV (obstructive sleep apnea)    on a dental device      Past Surgical History:   Procedure Laterality Date    Other  2020    fibroid removed; no associated bleeding complications    Other surgical history      exercise induced,compartment syndrome      Family History   Problem Relation Age of Onset    Cancer Father 82        Bladder Cancer    Bleeding Disorders Neg     DVT/VTE Neg       Social History:   Social History     Socioeconomic History    Marital status: Single   Tobacco Use    Smoking status: Never    Smokeless tobacco: Never   Vaping Use    Vaping status: Never Used   Substance and Sexual Activity    Alcohol use: Not Currently     Comment: occasionally    Drug use: No   Social History Narrative    Kaur is single. She has no children. 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 She lives alone in [place], [place].     Social Determinants of Health     Food Insecurity: Low Risk  (10/10/2023)    Received from [facility]    Food Insecurity     Have there been times that your food ran out, and you didn't have money to get more?: No     Are there times that you worry that this might happen?: No   Transportation Needs: Low Risk  (10/10/2023)    Received from [facility]    Transportation Needs     Do you have trouble getting transportation to medical  appointments?: No    [facility]    Social Connections        Objective:   Vitals:    08/29/24 1206   BP: 115/78   Pulse:    Resp:    Temp:        Physical Exam  Constitutional:       Appearance: Normal appearance.   HENT:      Head: Normocephalic and atraumatic.      Right Ear: Tympanic membrane normal.      Left Ear: Tympanic membrane normal.      Nose: Nose normal.      Mouth/Throat:      Mouth: Mucous membranes are moist.   Neck:      Thyroid: No thyromegaly.   Cardiovascular:      Rate and Rhythm: Normal rate and regular rhythm.      Heart sounds: Normal heart sounds.   Pulmonary:      Breath sounds: Normal breath sounds.   Neurological:      Mental Status: She is alert and oriented to person, place, and time.   Psychiatric:         Mood and Affect: Mood normal.         Assessment & Plan:   1. Healthy adult on routine physical examination  General Well exam and the following has been ordered.  - EKG In-Office [31463]; normal sinus rate and rhythm.  No Q waves.  No ST-T wave changes.  - CBC With Differential With Platelet; Future  - Comp Metabolic Panel (14); Future  - Urinalysis with Culture Reflex; Future  - TSH W Reflex To Free T4; Future  - CBC With Differential With Platelet  - Comp Metabolic Panel (14)  - Urinalysis with Culture Reflex  - TSH W Reflex To Free T4    2. Encounter for screening mammogram for breast cancer  Ordered.  - [facility] MADI 2D+3D SCREENING BILAT (CPT=77067/17573); Future    3. Varicella zoster virus (VZV) vaccination declined  Declined for now.  - Zoster Recombinant Adjuvanted (Shingrix -Shingles) [75141]    4. Pneumococcal vaccination declined by patient  Declined for now.  - Prevnar 20 (PCV20) [09682]    5. Patient undergoing in vitro fertilization  Ordered.  - Prolactin [E]; Future  - HIV AG AB Combo [E]; Future  - Hepatitis A B + C profile [E]; Future  - Prolactin [E]  - HIV AG AB Combo [E]  - Hepatitis A B + C profile  [E]    6. Class 2 obesity without serious comorbidity with body mass index (BMI) of 36.0 to 36.9 in adult, unspecified obesity type  Recommend weight loss via daily exercising and consistent healthy dietary changes.        Orders Placed This Encounter   Procedures    CBC With Differential With Platelet    Comp Metabolic Panel (14)    Prolactin [E]    Urinalysis with Culture Reflex    HIV AG AB Combo [E]    Hepatitis A B + C profile [E]    TSH W Reflex To Free T4    HIV Ag/Ab Combo    Prevnar 20 (PCV20) [30799]    Zoster Recombinant Adjuvanted (Shingrix -Shingles) [60443]       Meds This Visit:  Requested Prescriptions      No prescriptions requested or ordered in this encounter       Imaging & Referrals:  PCV20 VACCINE FOR INTRAMUSCULAR USE  ZOSTER VACC RECOMBINANT IM NJX  ELECTROCARDIOGRAM, COMPLETE  PINA MADI 2D+3D SCREENING BILAT (CPT=77067/97496)     Patient Instructions   All adult screening ordered and done appropriate for patient's age and gender and risk factors and complaints.  Recommend weight loss via daily exercising and consistent healthy dietary changes.  Encouraged physical fitness and daily physical activity daily.  Comply with medications.      Return in about 1 year (around 8/29/2025), or if symptoms worsen or fail to improve.

## 2024-09-06 ENCOUNTER — TELEPHONE (OUTPATIENT)
Dept: FAMILY MEDICINE CLINIC | Facility: CLINIC | Age: 49
End: 2024-09-06

## 2024-09-06 NOTE — TELEPHONE ENCOUNTER
Patient called, verified Name and . Requesting recent lab work to be sent to. Dr. Omer Matias's office. Labs  successfully faxed to number provided below via [employer]ax at 10:47.   Attn: IVF Coordinator  Fax: [phone]    She will also need documentation or note stating that stress test is no longer needed based on her EKG result, and that she is cleared to move forward in the process of undergoing in vitro fertilization. She is hoping to get the note before .    Also seeking recommendation from primary care provider if it is advisable for her to get the Covid booster now or wait until after pregnancy.    Dr. Jensen please advise. Pended letter for review and approval if appropriate.

## 2024-09-07 NOTE — TELEPHONE ENCOUNTER
, please adviser if it is ok  for her to get the Covid booster now or wait until after pregnancy.     Left message to call back on patient's voicemail.

## 2024-09-07 NOTE — TELEPHONE ENCOUNTER
Letter has been reviewed, signed, and sent to the patient's MyChart.  Please call the patient and let her know.  Thank you.

## 2024-09-09 NOTE — TELEPHONE ENCOUNTER
Called patient, confirmed name and .    Patient advised of Dr. Jensen's recommendation to wait until IVF is completed. Patient verbalized understanding.

## 2024-12-27 ENCOUNTER — LAB ENCOUNTER (OUTPATIENT)
Dept: LAB | Facility: HOSPITAL | Age: 49
End: 2024-12-27
Payer: COMMERCIAL

## 2024-12-27 DIAGNOSIS — E11.9 CONTROLLED TYPE 2 DIABETES MELLITUS WITHOUT COMPLICATION, WITHOUT LONG-TERM CURRENT USE OF INSULIN (HCC): ICD-10-CM

## 2024-12-27 DIAGNOSIS — R73.03 PREDIABETES: ICD-10-CM

## 2024-12-27 LAB
CHOLEST SERPL-MCNC: 181 MG/DL (ref ?–200)
EST. AVERAGE GLUCOSE BLD GHB EST-MCNC: 126 MG/DL (ref 68–126)
FASTING PATIENT LIPID ANSWER: NO
HBA1C MFR BLD: 6 % (ref ?–5.7)
HDLC SERPL-MCNC: 47 MG/DL (ref 40–59)
LDLC SERPL CALC-MCNC: 87 MG/DL (ref ?–100)
NONHDLC SERPL-MCNC: 134 MG/DL (ref ?–130)
TRIGL SERPL-MCNC: 282 MG/DL (ref 30–149)
VLDLC SERPL CALC-MCNC: 46 MG/DL (ref 0–30)

## 2024-12-27 PROCEDURE — 80061 LIPID PANEL: CPT

## 2024-12-27 PROCEDURE — 36415 COLL VENOUS BLD VENIPUNCTURE: CPT

## 2024-12-27 PROCEDURE — 83036 HEMOGLOBIN GLYCOSYLATED A1C: CPT

## 2025-02-04 ENCOUNTER — PATIENT MESSAGE (OUTPATIENT)
Dept: FAMILY MEDICINE CLINIC | Facility: CLINIC | Age: 50
End: 2025-02-04

## 2025-02-05 ENCOUNTER — TELEPHONE (OUTPATIENT)
Dept: FAMILY MEDICINE CLINIC | Facility: CLINIC | Age: 50
End: 2025-02-05

## 2025-02-05 DIAGNOSIS — R60.0 FLUID RETENTION IN LEGS: ICD-10-CM

## 2025-02-05 NOTE — TELEPHONE ENCOUNTER
Spoke with patient, (  Name and date of birth verified ) informed of Dr. Jensen's  instructions below    She has been trying to elevate her legs    Advised to call back if symptoms and/ or condition worsens      Patient verbalizes understanding and agrees with plan.

## 2025-02-05 NOTE — TELEPHONE ENCOUNTER
Pt called as advised - Please review chani   Pt stated she noticed this several weeks ago, starting drinking cleansing tea, no swelling in AM but at end of day, nontender  Asking if she need to be back on potassium and furosemide again for a short time    LOV 8/29/24, f/u Phx appt 8/21/25      Hi Dr. Jensen,     I believe that I am retaining water as every night there is a large indentation from my socks. I recall this being an issue the last time I stopped the steroids. At this time, I am still experiencing the affects of stopping the steroids (body aches mostly) & think this is yet another that I am faced with. Do you recommend I take Furosimide and Potassium for a short time or do something different? Thanks in advance!     --Kaur

## 2025-02-05 NOTE — TELEPHONE ENCOUNTER
Yes the patient should go back on the furosemide with the potassium supplement for 3 to 5 days and see if that will suffice for bringing the extra fluid off of her limbs.  When she is sitting idle, please advise her to keep her legs elevated.

## 2025-02-07 RX ORDER — FUROSEMIDE 20 MG/1
20 TABLET ORAL DAILY
Qty: 5 TABLET | Refills: 0 | Status: SHIPPED | OUTPATIENT
Start: 2025-02-07

## 2025-02-07 RX ORDER — POTASSIUM CHLORIDE 750 MG/1
10 TABLET, EXTENDED RELEASE ORAL DAILY
Qty: 5 TABLET | Refills: 0 | Status: SHIPPED | OUTPATIENT
Start: 2025-02-07

## 2025-02-07 NOTE — TELEPHONE ENCOUNTER
Patient states went to pharmacy to  medications and was told none were sent.  Pended for review.

## 2025-02-07 NOTE — TELEPHONE ENCOUNTER
Called patient , No answer, voicemail box is full and unable to leave message.  [employer] message sent that furosemide and potassium  prescriptions was sent to pharmacy.

## 2025-02-07 NOTE — TELEPHONE ENCOUNTER
Thank you for that.  Medication reviewed and sent to the pharmacy.  Please call the patient and let her know.

## 2025-02-20 ENCOUNTER — TELEPHONE (OUTPATIENT)
Dept: FAMILY MEDICINE CLINIC | Facility: CLINIC | Age: 50
End: 2025-02-20

## 2025-02-20 DIAGNOSIS — Z12.31 ENCOUNTER FOR SCREENING MAMMOGRAM FOR MALIGNANT NEOPLASM OF BREAST: Primary | ICD-10-CM

## 2025-02-20 DIAGNOSIS — R60.0 FLUID RETENTION IN LEGS: ICD-10-CM

## 2025-02-20 NOTE — TELEPHONE ENCOUNTER
The patient states she finishes her furosemide and potassium on Saturday and she noticed she is retaining water again. She states her socks are leaving an indentation in her legs. She has been elevating her legs during the day, drinking plenty of water but not wearing her compression socks. She is asking for further advice or next steps?

## 2025-02-21 RX ORDER — FUROSEMIDE 20 MG/1
40 TABLET ORAL DAILY
Qty: 10 TABLET | Refills: 0 | Status: SHIPPED | OUTPATIENT
Start: 2025-02-21 | End: 2025-02-26

## 2025-02-21 RX ORDER — POTASSIUM CHLORIDE 750 MG/1
20 TABLET, EXTENDED RELEASE ORAL DAILY
Qty: 10 TABLET | Refills: 0 | Status: SHIPPED | OUTPATIENT
Start: 2025-02-21 | End: 2025-02-26

## 2025-02-21 NOTE — TELEPHONE ENCOUNTER
We need to continue with the furosemide and potassium until the patient can be seen in the clinic.  I have doubled the dosage for both medications.  She will now be taken the equivalent of 40 mg on the furosemide and 20 mEq with the potassium.  If there is any existing 24-hour RES appointment available for the coming week, please put her in.

## 2025-02-21 NOTE — TELEPHONE ENCOUNTER
Advised patient of Dr Jensen's note. Patient verbalized understanding.     Future Appointments   Date Time Provider Department Center   2/25/2025 11:20 AM [facility]   2/26/2025 10:15 AM [facility]   8/21/2025  3:40 PM Wang Jensen DO [facility]

## 2025-02-25 ENCOUNTER — OFFICE VISIT (OUTPATIENT)
Dept: FAMILY MEDICINE CLINIC | Facility: CLINIC | Age: 50
End: 2025-02-25
Payer: COMMERCIAL

## 2025-02-25 VITALS
HEART RATE: 96 BPM | BODY MASS INDEX: 37 KG/M2 | RESPIRATION RATE: 18 BRPM | TEMPERATURE: 98 F | SYSTOLIC BLOOD PRESSURE: 113 MMHG | DIASTOLIC BLOOD PRESSURE: 76 MMHG | WEIGHT: 216 LBS | OXYGEN SATURATION: 98 %

## 2025-02-25 DIAGNOSIS — Z28.21 INFLUENZA VACCINATION DECLINED BY PATIENT: ICD-10-CM

## 2025-02-25 DIAGNOSIS — G47.33 OSA (OBSTRUCTIVE SLEEP APNEA): ICD-10-CM

## 2025-02-25 DIAGNOSIS — J45.20 MILD INTERMITTENT ASTHMA WITHOUT COMPLICATION (HCC): ICD-10-CM

## 2025-02-25 DIAGNOSIS — R73.03 PREDIABETES: Primary | ICD-10-CM

## 2025-02-25 DIAGNOSIS — R60.0 BILATERAL LEG EDEMA: ICD-10-CM

## 2025-02-25 DIAGNOSIS — Z23 NEED FOR PNEUMOCOCCAL VACCINATION: ICD-10-CM

## 2025-02-25 LAB
CREAT UR-SCNC: 67.4 MG/DL
MICROALBUMIN UR-MCNC: <0.3 MG/DL

## 2025-02-25 PROCEDURE — 90471 IMMUNIZATION ADMIN: CPT | Performed by: FAMILY MEDICINE

## 2025-02-25 PROCEDURE — 99214 OFFICE O/P EST MOD 30 MIN: CPT | Performed by: FAMILY MEDICINE

## 2025-02-25 PROCEDURE — 3061F NEG MICROALBUMINURIA REV: CPT | Performed by: FAMILY MEDICINE

## 2025-02-25 PROCEDURE — 3074F SYST BP LT 130 MM HG: CPT | Performed by: FAMILY MEDICINE

## 2025-02-25 PROCEDURE — 3078F DIAST BP <80 MM HG: CPT | Performed by: FAMILY MEDICINE

## 2025-02-25 PROCEDURE — 90677 PCV20 VACCINE IM: CPT | Performed by: FAMILY MEDICINE

## 2025-02-25 RX ORDER — FUROSEMIDE 40 MG/1
40 TABLET ORAL DAILY
Qty: 30 TABLET | Refills: 0 | Status: SHIPPED | OUTPATIENT
Start: 2025-02-25

## 2025-02-25 RX ORDER — POTASSIUM CHLORIDE 1500 MG/1
20 TABLET, EXTENDED RELEASE ORAL DAILY
Qty: 30 TABLET | Refills: 0 | Status: SHIPPED | OUTPATIENT
Start: 2025-02-25

## 2025-02-25 NOTE — PATIENT INSTRUCTIONS
Echocardiogram and venous imaging ordered.  Continue with diuresis.  Pending the results of the echo and venous imaging, this may allow for us to treat in a different manner.  I do encourage patient to use mouth appliance with consistency as well as support stockings during the day.

## 2025-02-25 NOTE — PROGRESS NOTES
Subjective:     Patient ID: Kaur Richardson is a 49 year old female.    This patient is a 49-year-old well-established -American female who unfortunately continues to retain fluid in the bilateral lower extremities.  Even after 3 to 5 days of diuresis with furosemide in combination with potassium, the fluid returns shortly thereafter.    Patient was placed on oral steroids at a low maintenance dosage and it was discontinued via slow after being on them approximately 2 years. Steroid ended @ the end of November of 2024.  Patient has been taking 5 days of furosemide along with potassium in order to bring about resolution of fluid retention.  There has been a response, but the fluid returns.    History of asthma (well controlled).    Has sleep apnea (confirmed), uses mouth appliance inconsistently.    I have had a discussion with the patient regarding the persistence of her fluid retention/return.  We will do an echocardiogram in order to make for sure that there is no evidence for right-sided heart failure (pulmonary hypertension) in lieu of her sleep apnea in combination with her asthma.    We have also discussed looking at her venous competency in her lower extremities to make sure that this is not venous insufficiency.    Patient understands that we plan to proceed and consents to comply.    Patient declines on seasonal influenza vaccine-already given for this season.  Patient receptive to Prevnar vaccine.            History/Other:   Review of Systems  Current Outpatient Medications   Medication Sig Dispense Refill    potassium chloride 10 MEQ Oral Tab CR Take 2 tablets (20 mEq total) by mouth daily for 5 days. To be taken with furosemide. 10 tablet 0    furosemide 20 MG Oral Tab Take 2 tablets (40 mg total) by mouth daily for 5 days. Please take this medication before 10 AM. 10 tablet 0    Vitamin C 500 MG Oral Tab Take 1 tablet (500 mg total) by mouth daily.      Multiple Vitamins-Minerals  (MULTI-VITAMIN/MINERALS) Oral Tab Take 1 tablet by mouth daily. M.T.X. supplement per patient      Vitamin E 45 MG (100 UNIT) Oral Cap Take 45 mg by mouth daily.      Omega-3 1000 MG Oral Cap Take by mouth.      Cholecalciferol (VITAMIN D) 25 MCG (1000 UT) Oral Tab As Directed. Taking 5,000 Units      albuterol 108 (90 Base) MCG/ACT Inhalation Aero Soln Inhale 2 puffs into the lungs every 6 (six) hours as needed for Wheezing. 1 each 3    Bacillus Coagulans-Inulin (ALIGN PREBIOTIC-PROBIOTIC OR) Take by mouth daily.      aspirin 81 MG Oral Tab EC Take 1 tablet (81 mg total) by mouth daily.      zinc oxide 20 % External Ointment Apply topically as needed for Dry Skin.      cyanocobalamin 500 MCG Oral Tab Take 1 tablet (500 mcg total) by mouth daily. (Patient not taking: Reported on 2/25/2025)      predniSONE 10 MG Oral Tab Take 1.5 tablets (15 mg total) by mouth daily.      hydroxychloroquine 200 MG Oral Tab Take 1 tablet (200 mg total) by mouth 2 (two) times daily. Will start in 2 weeks      tacrolimus 1 MG Oral Cap Take 1 capsule (1 mg total) by mouth in the morning, at noon, and at bedtime. Will start in 2 weeks       Allergies:Allergies[1]    Past Medical History:    Extrinsic asthma, unspecified    History of COVID-19    RAGHAV (obstructive sleep apnea)    on a dental device      Past Surgical History:   Procedure Laterality Date    Other  2020    fibroid removed; no associated bleeding complications    Other surgical history      exercise induced,compartment syndrome      Family History   Problem Relation Age of Onset    Cancer Father 82        Bladder Cancer    Bleeding Disorders Neg     DVT/VTE Neg       Social History:   Social History     Socioeconomic History    Marital status: Single   Tobacco Use    Smoking status: Never    Smokeless tobacco: Never   Vaping Use    Vaping status: Never Used   Substance and Sexual Activity    Alcohol use: Not Currently     Comment: occasionally    Drug use: No   Social History  [place] Dietrich is single. She has no children. 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     Social Drivers of Health     Food Insecurity: Low Risk  (10/10/2023)    Received from [facility]    Food Insecurity     Have there been times that your food ran out, and you didn't have money to get more?: No     Are there times that you worry that this might happen?: No   Transportation Needs: Low Risk  (10/10/2023)    Received from [facility]    Transportation Needs     Do you have trouble getting transportation to medical appointments?: No        Objective:   Vitals:    25 1131   BP: 113/76   Pulse: 96   Resp: 18   Temp: 98 °F (36.7 °C)       Physical Exam  Constitutional:       General: She is not in acute distress.     Appearance: Normal appearance. She is not ill-appearing.   Cardiovascular:      Rate and Rhythm: Normal rate and regular rhythm.      Heart sounds:      No gallop.   Pulmonary:      Effort: Pulmonary effort is normal.      Breath sounds: Normal breath sounds.   Musculoskeletal:      Right lower le+ Pitting Edema present.      Left lower le+ Pitting Edema present.   Neurological:      Mental Status: She is alert and oriented to person, place, and time.         Assessment & Plan:   1. Bilateral leg edema  Status unchanged.  The following tests have been ordered and the patient's potassium and furosemide have been refilled.  We will consider metolazone as an add-on to the furosemide pending the results of these ordered tests.  - US VENOUS INSUFFICIENCY (REFLUX) BILAT LOWER EXT (CPT=93970); Future  - CARD ECHO 2D DOPPLER (CPT=93306); Future  - furosemide 40 MG Oral Tab; Take 1 tablet (40 mg total) by mouth daily.  Dispense: 30 tablet; Refill: 0  - potassium chloride 20 MEQ Oral Tab CR; Take 1 tablet (20 mEq total) by mouth  daily.  Dispense: 30 tablet; Refill: 0    2. RAGHAV (obstructive sleep apnea)  Ordered.  - CARD ECHO 2D DOPPLER (CPT=93306); Future    3. Mild intermittent asthma without complication (HCC)  Well-controlled with very minimal exacerbations and very minimal inhaler use.    4. Prediabetes  Microalbumin test ordered.  - Microalb/Creat Ratio, Random Urine [E]; Future  - Microalb/Creat Ratio, Random Urine [E]    5. Influenza vaccination declined by patient  Declined by patient.  - Fluzone trivalent vaccine, PF 0.5mL, 6mo+ (01792)    6. Need for pneumococcal vaccination  Ordered and administered.  - Prevnar 20 (PCV20) [07860]      Orders Placed This Encounter   Procedures    Microalb/Creat Ratio, Random Urine [E]    Fluzone trivalent vaccine, PF 0.5mL, 6mo+ (32728)    Prevnar 20 (PCV20) [41397]       Meds This Visit:  Requested Prescriptions      No prescriptions requested or ordered in this encounter       Imaging & Referrals:  INFLUENZA VACCINE, TRI, PRESERV FREE, 0.5 ML  PCV20 VACCINE FOR INTRAMUSCULAR USE     Patient Instructions   Echocardiogram and venous imaging ordered.  Continue with diuresis.  Pending the results of the echo and venous imaging, this may allow for us to treat in a different manner.  I do encourage patient to use mouth appliance with consistency as well as support stockings during the day.    Return in about 6 weeks (around 4/8/2025), or if symptoms worsen or fail to improve.         [1]   Allergies  Allergen Reactions    Gluten Meal HIVES    Hydrocortisone HIVES    Neosporin Original [Neomycin-Bacitracin-Polymyxin] HIVES    Neosporin Pain Relieving HIVES    Nickel HIVES     rash      Allegra [Fexofenadine] HIVES    Gluten Flour     Neosporin Af [Miconazole] HIVES    Nickel      rash

## 2025-03-14 ENCOUNTER — HOSPITAL ENCOUNTER (OUTPATIENT)
Dept: ULTRASOUND IMAGING | Facility: HOSPITAL | Age: 50
Discharge: HOME | End: 2025-03-14
Attending: FAMILY MEDICINE
Payer: COMMERCIAL

## 2025-03-14 ENCOUNTER — HOSPITAL ENCOUNTER (OUTPATIENT)
Dept: CV DIAGNOSTICS | Facility: HOSPITAL | Age: 50
Discharge: HOME | End: 2025-03-14
Attending: FAMILY MEDICINE
Payer: COMMERCIAL

## 2025-03-14 DIAGNOSIS — R60.0 BILATERAL LEG EDEMA: ICD-10-CM

## 2025-03-14 DIAGNOSIS — G47.33 OSA (OBSTRUCTIVE SLEEP APNEA): ICD-10-CM

## 2025-03-14 PROCEDURE — 93970 EXTREMITY STUDY: CPT | Performed by: FAMILY MEDICINE

## 2025-03-14 PROCEDURE — 93306 TTE W/DOPPLER COMPLETE: CPT | Performed by: FAMILY MEDICINE

## 2025-03-18 ENCOUNTER — HOSPITAL ENCOUNTER (OUTPATIENT)
Dept: MAMMOGRAPHY | Age: 50
Discharge: HOME | End: 2025-03-18
Attending: OBSTETRICS & GYNECOLOGY
Payer: COMMERCIAL

## 2025-03-18 DIAGNOSIS — Z12.31 ENCOUNTER FOR SCREENING MAMMOGRAM FOR MALIGNANT NEOPLASM OF BREAST: ICD-10-CM

## 2025-03-18 PROCEDURE — 77063 BREAST TOMOSYNTHESIS BI: CPT | Performed by: OBSTETRICS & GYNECOLOGY

## 2025-03-18 PROCEDURE — 77067 SCR MAMMO BI INCL CAD: CPT | Performed by: OBSTETRICS & GYNECOLOGY

## 2025-03-25 ENCOUNTER — TELEPHONE (OUTPATIENT)
Dept: FAMILY MEDICINE CLINIC | Facility: CLINIC | Age: 50
End: 2025-03-25

## 2025-03-25 DIAGNOSIS — R60.0 BILATERAL LEG EDEMA: ICD-10-CM

## 2025-03-25 DIAGNOSIS — I87.2 VENOUS INSUFFICIENCY OF BOTH LOWER EXTREMITIES: Primary | ICD-10-CM

## 2025-03-25 RX ORDER — POTASSIUM CHLORIDE 1500 MG/1
20 TABLET, EXTENDED RELEASE ORAL DAILY
Qty: 30 TABLET | Refills: 0 | Status: SHIPPED | OUTPATIENT
Start: 2025-03-25

## 2025-03-25 RX ORDER — FUROSEMIDE 40 MG/1
40 TABLET ORAL DAILY
Qty: 30 TABLET | Refills: 0 | Status: SHIPPED | OUTPATIENT
Start: 2025-03-25

## 2025-03-25 NOTE — TELEPHONE ENCOUNTER
Yes.  The patient should continue with the furosemide and potassium supplement as needed.  I did send her a MyChart message regarding the echocardiogram results.

## 2025-03-25 NOTE — TELEPHONE ENCOUNTER
1) pt asking if she need to continue lasix /K+ medications, still have ongoing swelling-bilateral, no swelling up awaking but swells through out the day, if needed Rx's pending     2) had US - please review  and advise

## 2025-03-26 NOTE — TELEPHONE ENCOUNTER
Thank you for that.  The venous ultrasound has some findings that may or may not be contributing to fluid retention.  It is well worth the patient been seen by a vascular consultants.  Referral has been placed on the chart.

## 2025-03-28 DIAGNOSIS — R92.8 ABNORMALITY OF RIGHT BREAST ON SCREENING MAMMOGRAM: Primary | ICD-10-CM

## 2025-04-04 ENCOUNTER — HOSPITAL ENCOUNTER (OUTPATIENT)
Dept: MAMMOGRAPHY | Facility: HOSPITAL | Age: 50
Discharge: HOME | End: 2025-04-04
Attending: OBSTETRICS & GYNECOLOGY
Payer: COMMERCIAL

## 2025-04-04 DIAGNOSIS — R92.8 ABNORMAL MAMMOGRAM: ICD-10-CM

## 2025-04-04 PROCEDURE — 77065 DX MAMMO INCL CAD UNI: CPT | Performed by: OBSTETRICS & GYNECOLOGY

## 2025-04-04 PROCEDURE — 77061 BREAST TOMOSYNTHESIS UNI: CPT | Performed by: OBSTETRICS & GYNECOLOGY

## 2025-04-12 ENCOUNTER — LAB ENCOUNTER (OUTPATIENT)
Dept: LAB | Facility: HOSPITAL | Age: 50
End: 2025-04-12
Attending: INTERNAL MEDICINE
Payer: COMMERCIAL

## 2025-04-12 DIAGNOSIS — E11.9 CONTROLLED TYPE 2 DIABETES MELLITUS WITHOUT COMPLICATION, WITHOUT LONG-TERM CURRENT USE OF INSULIN (HCC): ICD-10-CM

## 2025-04-12 LAB
EST. AVERAGE GLUCOSE BLD GHB EST-MCNC: 128 MG/DL (ref 68–126)
HBA1C MFR BLD: 6.1 % (ref ?–5.7)

## 2025-04-12 PROCEDURE — 36415 COLL VENOUS BLD VENIPUNCTURE: CPT

## 2025-04-12 PROCEDURE — 83036 HEMOGLOBIN GLYCOSYLATED A1C: CPT

## 2025-04-17 ENCOUNTER — OFFICE VISIT (OUTPATIENT)
Facility: CLINIC | Age: 50
End: 2025-04-17
Payer: COMMERCIAL

## 2025-04-17 VITALS
SYSTOLIC BLOOD PRESSURE: 117 MMHG | DIASTOLIC BLOOD PRESSURE: 72 MMHG | WEIGHT: 212 LBS | HEIGHT: 64 IN | BODY MASS INDEX: 36.19 KG/M2

## 2025-04-17 DIAGNOSIS — I87.2 VENOUS INSUFFICIENCY: Primary | ICD-10-CM

## 2025-04-17 NOTE — PROGRESS NOTES
VASCULAR SURGERY CONSULT NOTE      Kaur Richardson   :  1975  MR#  [id number]    REFERRING PHYSICIAN:  Wang Jensen  PRIMARY CARE PHYSICIAN:  Wang Jensen DO    Chief Complaint   Patient presents with    Consult    Edema     Patient presents here c/o bilateral leg edema x 4 months  She wears knee high compressions daily x 2 months      Test Results     Recent Reflux ultrasound       HPI:    The patient is a 49 year old female who has been referred to the clinic today for an evaluation of her bilateral lower extremity edema. She reports the swelling of lower limbs has gradually worsened for the past 6 months. It has been associated with heaviness, tiredness, and pressure. Denies any cardiac or kidney disorders. She takes diuretics on a daily basis with partial relief of her symptoms. She has worn knee high compression stockings in the recent past with minimal improvement of the edema. Due to these symptoms, a venous reflux ultrasound was ordered 3/14/25 by her primary care physician which revealed mild bilateral deep and superficial venous insufficiency.     PAST MEDICAL HISTORY:   Past Medical History[1]    PAST SURGICAL HISTORY:   Past Surgical History[2]     MEDICATIONS:   Current Medications[3]    ALLERGIES:   Allergies[4]    SOCIAL HISTORY:   Short Social Hx on File[5]     FAMILY HISTORY:   Family History[6]    ROS:   A 12 point review of systems with pertinent positives and negatives listed in the HPI.    PHYSICAL EXAM:   /72 (BP Location: Radial, Patient Position: Sitting)   Ht 5' 4\" (1.626 m)   Wt 212 lb (96.2 kg)   LMP 2025 (Approximate)   BMI 36.39 kg/m²   GENERAL: alert and orientated X 3, well developed, well nourished, in no apparent distress  HEENT: ears and throat are clear  NECK: supple, no lymphadenopathy, thyroid wnl  CAROTID: No bruits  RESPIRATORY: no rales, rhonchi, or wheezes B  CARDIO: RRR without murmur, no murmur, no gallop   ABDOMEN: soft, non-tender  with no palpable aneurysm or masses  BACK: normal, no tenderness  SKIN: no rashes, warm and dry  NEURO/PSYCH: orientated x3, normal mood and affect, no sensory or motor deficit  EXTREMITIES: full range of motion, no tenderness or edema in either leg.   VASCULAR  Pulse exam right: femoral 2+, DP  2+, PT  2+  Pulse exam left: femoral  2+, DP  2+, PT  2+      Vein Assessment:      Mild scattered bilateral  LE reticular veins with no significant hemosiderin deposition.     IMPRESSION:   Bilateral  lower extremity venous insufficiency.   2.    Mild bilateral lower extremity edema.     PLAN:     We reviewed the options for management of venous insufficiency, including conservative therapy, sclerotherapy, stab phlebectomy, and endovenous laser ablation. The patient was educated in the benign condition of venous disease.  I have given the patient a prescription for Grade II compression stockings (20-30 mmHg, thigh-highs). We reviewed the importance of wearing these daily and consistently. We also reviewed other types of conservative measures, such as periodic leg elevation, walking for exercise, analgesic use, attempts at weight loss, and the avoidance of prolonged standing.  I have instructed the patient to repeat a venous reflux ultrasound in 4 months to evaluate her response to conservative treatment. Should the ultrasound study reveal worsening of venous reflux with dilation and she does not have relief of symptoms with conservative therapy, then endovenous laser ablation may be a possible treatment option.    The patient understood and agreed to proceed with this treatment plan, all of her questions were answered during this clinic visit.       Thank you for allowing to participate in the care of your patient.     EMERY Vogt  Division of Vascular Surgery.        [1]   Past Medical History:   Extrinsic asthma, unspecified    History of COVID-19    RAGHAV (obstructive sleep apnea)    on a dental device   [2]    Past Surgical History:  Procedure Laterality Date    Other  2020    fibroid removed; no associated bleeding complications    Other surgical history      exercise induced,compartment syndrome   [3]   Current Outpatient Medications   Medication Sig Dispense Refill    potassium chloride 20 MEQ Oral Tab CR Take 1 tablet (20 mEq total) by mouth daily. 30 tablet 0    Vitamin C 500 MG Oral Tab Take 1 tablet (500 mg total) by mouth daily.      Bacillus Coagulans-Inulin (ALIGN PREBIOTIC-PROBIOTIC OR) Take by mouth daily.      cyanocobalamin 500 MCG Oral Tab Take 1 tablet (500 mcg total) by mouth daily.      Multiple Vitamins-Minerals (MULTI-VITAMIN/MINERALS) Oral Tab Take 1 tablet by mouth daily. M.T.X. supplement per patient      Vitamin E 45 MG (100 UNIT) Oral Cap Take 45 mg by mouth daily.      Omega-3 1000 MG Oral Cap Take by mouth.      Cholecalciferol (VITAMIN D) 25 MCG (1000 UT) Oral Tab As Directed. Taking 5,000 Units      albuterol 108 (90 Base) MCG/ACT Inhalation Aero Soln Inhale 2 puffs into the lungs every 6 (six) hours as needed for Wheezing. 1 each 3    furosemide 40 MG Oral Tab Take 1 tablet (40 mg total) by mouth daily. (Patient not taking: Reported on 4/17/2025) 30 tablet 0    aspirin 81 MG Oral Tab EC Take 1 tablet (81 mg total) by mouth daily.      zinc oxide 20 % External Ointment Apply topically as needed for Dry Skin.      predniSONE 10 MG Oral Tab Take 1.5 tablets (15 mg total) by mouth daily.      hydroxychloroquine 200 MG Oral Tab Take 1 tablet (200 mg total) by mouth 2 (two) times daily. Will start in 2 weeks      tacrolimus 1 MG Oral Cap Take 1 capsule (1 mg total) by mouth in the morning, at noon, and at bedtime. Will start in 2 weeks     [4]   Allergies  Allergen Reactions    Gluten Meal HIVES    Hydrocortisone HIVES    Neosporin Original [Neomycin-Bacitracin-Polymyxin] HIVES    Neosporin Pain Relieving HIVES    Nickel HIVES     rash      Allegra [Fexofenadine] HIVES    Gluten Flour      Neosporin Af [Miconazole] HIVES    Nickel      rash   [5]   Social History  Socioeconomic History    Marital status: Single   Tobacco Use    Smoking status: Never    Smokeless tobacco: Never   Vaping Use    Vaping status: Never Used   Substance and Sexual Activity    Alcohol use: Not Currently     Comment: occasionally    Drug use: No   Social History Narrative    Kaur is single. She has no children. 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 She lives alone in [place], [place].     Social Drivers of Health     Food Insecurity: Low Risk  (10/10/2023)    Received from [facility]    Food Insecurity     Have there been times that your food ran out, and you didn't have money to get more?: No     Are there times that you worry that this might happen?: No   Transportation Needs: Low Risk  (10/10/2023)    Received from [facility]    Transportation Needs     Do you have trouble getting transportation to medical appointments?: No   [6]   Family History  Problem Relation Age of Onset    Cancer Father 82        Bladder Cancer    Bleeding Disorders Neg     DVT/VTE Neg     Breast Cancer Neg     Ovarian Cancer Neg

## 2025-04-18 ENCOUNTER — PATIENT MESSAGE (OUTPATIENT)
Facility: CLINIC | Age: 50
End: 2025-04-18

## 2025-04-25 DIAGNOSIS — R60.0 BILATERAL LEG EDEMA: ICD-10-CM

## 2025-04-28 DIAGNOSIS — R60.0 BILATERAL LEG EDEMA: ICD-10-CM

## 2025-04-29 RX ORDER — POTASSIUM CHLORIDE 1500 MG/1
20 TABLET, EXTENDED RELEASE ORAL DAILY
Qty: 30 TABLET | Refills: 0 | OUTPATIENT
Start: 2025-04-29

## 2025-04-29 RX ORDER — POTASSIUM CHLORIDE 1500 MG/1
20 TABLET, EXTENDED RELEASE ORAL DAILY
Qty: 90 TABLET | Refills: 0 | Status: SHIPPED | OUTPATIENT
Start: 2025-04-29

## 2025-04-29 RX ORDER — FUROSEMIDE 40 MG/1
40 TABLET ORAL DAILY
Qty: 90 TABLET | Refills: 0 | Status: SHIPPED | OUTPATIENT
Start: 2025-04-29

## 2025-04-29 NOTE — TELEPHONE ENCOUNTER
Refill passed per Clinic protocol.    Please advise on patients comment  Patient Comment: The Vascular NP would like for me to continue this for months.     Requested Prescriptions   Pending Prescriptions Disp Refills    potassium chloride 20 MEQ Oral Tab CR 30 tablet 0     Sig: Take 1 tablet (20 mEq total) by mouth daily.       There is no refill protocol information for this order       furosemide 40 MG Oral Tab 30 tablet 0     Sig: Take 1 tablet (40 mg total) by mouth daily.       Hypertension Medications Protocol Passed - 4/29/2025  2:26 PM        Passed - CMP or BMP in past 12 months        Passed - Last BP reading less than 140/90     BP Readings from Last 1 Encounters:   04/17/25 117/72               Passed - In person appointment or virtual visit in the past 12 mos or appointment in next 3 mos     Recent Outpatient Visits              1 week ago Venous insufficiency    [facility] Anupam Sánchez APRN    Office Visit    [facility] Wang Jensen DO    Office Visit    8 months ago Encounter for screening mammogram for breast cancer    [facility] Wang Jensen DO    Office Visit    10 months ago Neutropenia, unspecified type    [facility] Richard Acosta MD    Office Visit    10 months ago [facility] Arianna Griffith APRN    Office Visit          Future Appointments         Provider Department Appt Notes    In 6 days Arianna Griffith APRN [facility] 6 months    In 3 months Wang Jensen DO [facility] Annual Physical Exam                    Passed - EGFRCR or GFRAA > 50     GFR Evaluation  EGFRCR: 66 , resulted on 8/29/2024          Passed - Medication is  active on med list

## 2025-07-24 ENCOUNTER — TELEPHONE (OUTPATIENT)
Facility: CLINIC | Age: 50
End: 2025-07-24

## 2025-07-24 NOTE — TELEPHONE ENCOUNTER
Patient lmovm my direct line. Patient wanted to know if she can not wear stockings the day before appt. Please tell her yes the day before her US.    Patient also has a medication (possibly diuretic) that she may need refilled.    Please call patient back for confirmation and possible refill.

## 2025-07-25 ENCOUNTER — OFFICE VISIT (OUTPATIENT)
Dept: FAMILY MEDICINE CLINIC | Facility: CLINIC | Age: 50
End: 2025-07-25
Payer: COMMERCIAL

## 2025-07-25 VITALS
SYSTOLIC BLOOD PRESSURE: 102 MMHG | TEMPERATURE: 98 F | HEART RATE: 87 BPM | DIASTOLIC BLOOD PRESSURE: 68 MMHG | WEIGHT: 205 LBS | RESPIRATION RATE: 18 BRPM | OXYGEN SATURATION: 97 % | BODY MASS INDEX: 35 KG/M2

## 2025-07-25 DIAGNOSIS — G47.33 OSA (OBSTRUCTIVE SLEEP APNEA): ICD-10-CM

## 2025-07-25 DIAGNOSIS — R60.0 BILATERAL LEG EDEMA: Primary | ICD-10-CM

## 2025-07-25 DIAGNOSIS — J45.20 MILD INTERMITTENT ASTHMA WITHOUT COMPLICATION (HCC): ICD-10-CM

## 2025-07-25 DIAGNOSIS — R76.8 POSITIVE ANA (ANTINUCLEAR ANTIBODY): ICD-10-CM

## 2025-07-25 PROCEDURE — 3074F SYST BP LT 130 MM HG: CPT | Performed by: FAMILY MEDICINE

## 2025-07-25 PROCEDURE — 3044F HG A1C LEVEL LT 7.0%: CPT | Performed by: FAMILY MEDICINE

## 2025-07-25 PROCEDURE — 3078F DIAST BP <80 MM HG: CPT | Performed by: FAMILY MEDICINE

## 2025-07-25 PROCEDURE — 99214 OFFICE O/P EST MOD 30 MIN: CPT | Performed by: FAMILY MEDICINE

## 2025-07-25 RX ORDER — HYDROCORTISONE 25 MG/G
CREAM TOPICAL
COMMUNITY
Start: 2025-07-15

## 2025-07-25 RX ORDER — FUROSEMIDE 40 MG/1
40 TABLET ORAL DAILY
Qty: 90 TABLET | Refills: 0 | Status: SHIPPED | OUTPATIENT
Start: 2025-07-25

## 2025-07-25 RX ORDER — POTASSIUM CHLORIDE 1500 MG/1
20 TABLET, EXTENDED RELEASE ORAL DAILY
Qty: 90 TABLET | Refills: 0 | Status: SHIPPED | OUTPATIENT
Start: 2025-07-25

## 2025-07-25 NOTE — TELEPHONE ENCOUNTER
Returned phone call, per Anupam the patient can wear compression stockings the day before.  The patient can take all medications before the US.  It is not necessary to hod any meds.    Mylene

## 2025-07-25 NOTE — PROGRESS NOTES
Subjective:     Patient ID: Kaur Richardson is a 49 year old female.    This patient is a well-established 49-year-old hypertensive/obese by definition/prediabetic -American female who presents to follow-up on response to treatment regarding these chronic conditions.  Patient currently denies headaches, chest pain, dizziness, shortness of breath, acute visual changes, and/or exertional fatigue.    Patient continues to retain fluid in bilateral lower extremities which responds to diuresis via her furosemide and potassium supplement.  Patient still needs to get venous imaging to make a further assessment about the possibility of venous insufficiency contributing to this chronic and reoccurring fluid retention.      Patient does have serum tests that are highly suggestive of systemic inflammatory disorder and she needs a referral  follow-up with her rheumatology.     Although the patient is not formally diagnosed with diabetes, diabetic foot exam performed on today.        History/Other:   Review of Systems  Current Medications[1]  Allergies:Allergies[2]    Past Medical History[3]   Past Surgical History[4]   Family History[5]   Social History: Short Social Hx on File[6]     Objective:   Vitals:    07/25/25 0845   BP: 102/68   Pulse: 87   Resp: 18   Temp: 97.8 °F (36.6 °C)       Physical Exam  Constitutional:       General: She is not in acute distress.     Appearance: Normal appearance. She is not ill-appearing.   Cardiovascular:      Rate and Rhythm: Normal rate and regular rhythm.      Heart sounds:      No gallop.   Pulmonary:      Effort: Pulmonary effort is normal.      Breath sounds: Normal breath sounds.   Feet:      Comments: Bilateral barefoot skin diabetic exam is normal, visualized feet and the appearance is normal.  Bilateral monofilament/sensation of both feet is normal.  Pulsation pedal pulse exam of both lower legs/feet is normal as well.            Neurological:      General: No focal deficit  present.      Mental Status: She is alert and oriented to person, place, and time.         Assessment & Plan:   1. Bilateral leg edema  The following has been refilled.  - furosemide 40 MG Oral Tab; Take 1 tablet (40 mg total) by mouth daily.  Dispense: 90 tablet; Refill: 0  - potassium chloride 20 MEQ Oral Tab CR; Take 1 tablet (20 mEq total) by mouth daily.  Dispense: 90 tablet; Refill: 0    2. Positive MARIBELL (antinuclear antibody)  Patient referred to rheumatology.  - Rheumatology Referral - In Network    3. Mild intermittent asthma without complication (HCC)  Currently asymptomatic.    4. RAGHAV (obstructive sleep apnea)  Compliance with CPAP appliance encouraged and recommended.      No orders of the defined types were placed in this encounter.      Meds This Visit:  Requested Prescriptions     Pending Prescriptions Disp Refills    furosemide 40 MG Oral Tab 90 tablet 0     Sig: Take 1 tablet (40 mg total) by mouth daily.    potassium chloride 20 MEQ Oral Tab CR 90 tablet 0     Sig: Take 1 tablet (20 mEq total) by mouth daily.       Imaging & Referrals:  None     Patient Instructions   Medication reviewed and renewed where needed and appropriate.  Comply with medications.  Monitor blood pressures and record at home. Limit salt intake.  Encouraged physical fitness and daily physical activity daily.  Recommend weight loss via daily exercising and consistent healthy dietary changes.    Return in about 3 months (around 10/25/2025), or if symptoms worsen or fail to improve.         [1]   Current Outpatient Medications   Medication Sig Dispense Refill    hydrocortisone 2.5 % External Cream APPLY TO UPPER LIP AND CHEEKS TWICE DAILY FOR 7 TO 10 DAYS      cyanocobalamin 500 MCG Oral Tab Take 1 tablet (500 mcg total) by mouth daily.      Multiple Vitamins-Minerals (MULTI-VITAMIN/MINERALS) Oral Tab Take 1 tablet by mouth daily. M.T.X. supplement per patient      Vitamin E 45 MG (100 UNIT) Oral Cap Take 45 mg by mouth daily.       Cholecalciferol (VITAMIN D) 25 MCG (1000 UT) Oral Tab As Directed. Taking 5,000 Units      albuterol 108 (90 Base) MCG/ACT Inhalation Aero Soln Inhale 2 puffs into the lungs every 6 (six) hours as needed for Wheezing. 1 each 3    potassium chloride 20 MEQ Oral Tab CR Take 1 tablet (20 mEq total) by mouth daily. 90 tablet 0    furosemide 40 MG Oral Tab Take 1 tablet (40 mg total) by mouth daily. 90 tablet 0    Vitamin C 500 MG Oral Tab Take 1 tablet (500 mg total) by mouth daily.      Bacillus Coagulans-Inulin (ALIGN PREBIOTIC-PROBIOTIC OR) Take by mouth daily.      aspirin 81 MG Oral Tab EC Take 1 tablet (81 mg total) by mouth daily.      zinc oxide 20 % External Ointment Apply topically as needed for Dry Skin.      predniSONE 10 MG Oral Tab Take 1.5 tablets (15 mg total) by mouth daily.      hydroxychloroquine 200 MG Oral Tab Take 1 tablet (200 mg total) by mouth 2 (two) times daily. Will start in 2 weeks      tacrolimus 1 MG Oral Cap Take 1 capsule (1 mg total) by mouth in the morning, at noon, and at bedtime. Will start in 2 weeks      Omega-3 1000 MG Oral Cap Take by mouth. (Patient not taking: Reported on 7/25/2025)     [2]   Allergies  Allergen Reactions    Gluten Meal HIVES    Hydrocortisone HIVES    Neosporin Original [Neomycin-Bacitracin-Polymyxin] HIVES    Neosporin Pain Relieving HIVES    Nickel HIVES     rash      Allegra [Fexofenadine] HIVES    Gluten Flour     Neosporin Af [Miconazole] HIVES    Nickel      rash   [3]   Past Medical History:   Extrinsic asthma, unspecified    History of COVID-19    RAGHAV (obstructive sleep apnea)    on a dental device   [4]   Past Surgical History:  Procedure Laterality Date    Other  2020    fibroid removed; no associated bleeding complications    Other surgical history      exercise induced,compartment syndrome   [5]   Family History  Problem Relation Age of Onset    Cancer Father 82        Bladder Cancer    Bleeding Disorders Neg     DVT/VTE Neg     Breast Cancer  Neg     Ovarian Cancer Neg    [6]   Social History  Socioeconomic History    Marital status: Single   Tobacco Use    Smoking status: Never    Smokeless tobacco: Never   Vaping Use    Vaping status: Never Used   Substance and Sexual Activity    Alcohol use: Not Currently     Comment: occasionally    Drug use: No   Social History Narrative    Kaur is single. She has no children. 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     Social Drivers of Health     Food Insecurity: Low Risk  (10/10/2023)    Received from [facility]    Food Insecurity     Have there been times that your food ran out, and you didn't have money to get more?: No     Are there times that you worry that this might happen?: No   Transportation Needs: Low Risk  (10/10/2023)    Received from [facility]    Transportation Needs     Do you have trouble getting transportation to medical appointments?: No

## 2025-07-25 NOTE — PATIENT INSTRUCTIONS
Medication reviewed and renewed where needed and appropriate.  Comply with medications.  Monitor blood pressures and record at home. Limit salt intake.  Encouraged physical fitness and daily physical activity daily.  Recommend weight loss via daily exercising and consistent healthy dietary changes.

## 2025-08-13 ENCOUNTER — NURSE ONLY (OUTPATIENT)
Facility: CLINIC | Age: 50
End: 2025-08-13

## 2025-08-13 DIAGNOSIS — I87.2 VENOUS INSUFFICIENCY: ICD-10-CM

## 2025-08-13 PROCEDURE — 93970 EXTREMITY STUDY: CPT | Performed by: SURGERY

## 2025-08-14 ENCOUNTER — OFFICE VISIT (OUTPATIENT)
Facility: CLINIC | Age: 50
End: 2025-08-14

## 2025-08-14 VITALS — BODY MASS INDEX: 36 KG/M2 | SYSTOLIC BLOOD PRESSURE: 114 MMHG | DIASTOLIC BLOOD PRESSURE: 66 MMHG | WEIGHT: 207.81 LBS

## 2025-08-14 DIAGNOSIS — R60.0 BILATERAL LEG EDEMA: Primary | ICD-10-CM

## 2025-08-21 ENCOUNTER — OFFICE VISIT (OUTPATIENT)
Dept: FAMILY MEDICINE CLINIC | Facility: CLINIC | Age: 50
End: 2025-08-21

## 2025-08-21 VITALS
SYSTOLIC BLOOD PRESSURE: 110 MMHG | DIASTOLIC BLOOD PRESSURE: 78 MMHG | HEIGHT: 63.58 IN | BODY MASS INDEX: 35.98 KG/M2 | TEMPERATURE: 98 F | HEART RATE: 85 BPM | WEIGHT: 205.63 LBS | OXYGEN SATURATION: 96 %

## 2025-08-21 DIAGNOSIS — Z00.00 ROUTINE PHYSICAL EXAMINATION: Primary | ICD-10-CM

## 2025-08-21 DIAGNOSIS — E66.812 CLASS 2 SEVERE OBESITY WITH SERIOUS COMORBIDITY AND BODY MASS INDEX (BMI) OF 35.0 TO 35.9 IN ADULT, UNSPECIFIED OBESITY TYPE (HCC): ICD-10-CM

## 2025-08-21 DIAGNOSIS — R76.8 POSITIVE ANA (ANTINUCLEAR ANTIBODY): ICD-10-CM

## 2025-08-21 DIAGNOSIS — M25.512 ACUTE PAIN OF LEFT SHOULDER: ICD-10-CM

## 2025-08-21 DIAGNOSIS — E66.01 CLASS 2 SEVERE OBESITY WITH SERIOUS COMORBIDITY AND BODY MASS INDEX (BMI) OF 35.0 TO 35.9 IN ADULT, UNSPECIFIED OBESITY TYPE (HCC): ICD-10-CM

## 2025-08-21 DIAGNOSIS — E55.9 VITAMIN D DEFICIENCY: ICD-10-CM

## 2025-08-21 LAB
ALBUMIN SERPL-MCNC: 4.5 G/DL (ref 3.2–4.8)
ALBUMIN/GLOB SERPL: 1.3 (ref 1–2)
ALP LIVER SERPL-CCNC: 73 U/L (ref 39–100)
ALT SERPL-CCNC: 22 U/L (ref 10–49)
ANION GAP SERPL CALC-SCNC: 6 MMOL/L (ref 0–18)
AST SERPL-CCNC: 32 U/L (ref ?–34)
BASOPHILS # BLD AUTO: 0.04 X10(3) UL (ref 0–0.2)
BASOPHILS NFR BLD AUTO: 0.9 %
BILIRUB SERPL-MCNC: 0.2 MG/DL (ref 0.3–1.2)
BILIRUB UR QL: NEGATIVE
BUN BLD-MCNC: 10 MG/DL (ref 9–23)
BUN/CREAT SERPL: 8.7 (ref 10–20)
CALCIUM BLD-MCNC: 9.4 MG/DL (ref 8.7–10.4)
CHLORIDE SERPL-SCNC: 104 MMOL/L (ref 98–112)
CHOLEST SERPL-MCNC: 163 MG/DL (ref ?–200)
CLARITY UR: CLEAR
CO2 SERPL-SCNC: 26 MMOL/L (ref 21–32)
COLOR UR: COLORLESS
CREAT BLD-MCNC: 1.15 MG/DL (ref 0.55–1.02)
DEPRECATED RDW RBC AUTO: 42.9 FL (ref 35.1–46.3)
EGFRCR SERPLBLD CKD-EPI 2021: 58 ML/MIN/1.73M2 (ref 60–?)
EOSINOPHIL # BLD AUTO: 0.11 X10(3) UL (ref 0–0.7)
EOSINOPHIL NFR BLD AUTO: 2.6 %
ERYTHROCYTE [DISTWIDTH] IN BLOOD BY AUTOMATED COUNT: 13.3 % (ref 11–15)
FASTING PATIENT LIPID ANSWER: NO
FASTING STATUS PATIENT QL REPORTED: NO
GLOBULIN PLAS-MCNC: 3.6 G/DL (ref 2–3.5)
GLUCOSE BLD-MCNC: 99 MG/DL (ref 70–99)
GLUCOSE UR-MCNC: NORMAL MG/DL
HCT VFR BLD AUTO: 38.5 % (ref 35–48)
HDLC SERPL-MCNC: 42 MG/DL (ref 40–59)
HGB BLD-MCNC: 12.2 G/DL (ref 12–16)
HGB UR QL STRIP.AUTO: NEGATIVE
IMM GRANULOCYTES # BLD AUTO: 0.01 X10(3) UL (ref 0–1)
IMM GRANULOCYTES NFR BLD: 0.2 %
KETONES UR-MCNC: NEGATIVE MG/DL
LDLC SERPL CALC-MCNC: 92 MG/DL (ref ?–100)
LEUKOCYTE ESTERASE UR QL STRIP.AUTO: NEGATIVE
LYMPHOCYTES # BLD AUTO: 1.77 X10(3) UL (ref 1–4)
LYMPHOCYTES NFR BLD AUTO: 41.1 %
MCH RBC QN AUTO: 27.7 PG (ref 26–34)
MCHC RBC AUTO-ENTMCNC: 31.7 G/DL (ref 31–37)
MCV RBC AUTO: 87.3 FL (ref 80–100)
MONOCYTES # BLD AUTO: 0.48 X10(3) UL (ref 0.1–1)
MONOCYTES NFR BLD AUTO: 11.1 %
NEUTROPHILS # BLD AUTO: 1.9 X10 (3) UL (ref 1.5–7.7)
NEUTROPHILS # BLD AUTO: 1.9 X10(3) UL (ref 1.5–7.7)
NEUTROPHILS NFR BLD AUTO: 44.1 %
NITRITE UR QL STRIP.AUTO: NEGATIVE
NONHDLC SERPL-MCNC: 121 MG/DL (ref ?–130)
OSMOLALITY SERPL CALC.SUM OF ELEC: 281 MOSM/KG (ref 275–295)
PH UR: 5.5 (ref 5–8)
PLATELET # BLD AUTO: 301 10(3)UL (ref 150–450)
POTASSIUM SERPL-SCNC: 3.8 MMOL/L (ref 3.5–5.1)
PROT SERPL-MCNC: 8.1 G/DL (ref 5.7–8.2)
PROT UR-MCNC: NEGATIVE MG/DL
RBC # BLD AUTO: 4.41 X10(6)UL (ref 3.8–5.3)
SODIUM SERPL-SCNC: 136 MMOL/L (ref 136–145)
SP GR UR STRIP: 1.01 (ref 1–1.03)
TRIGL SERPL-MCNC: 165 MG/DL (ref 30–149)
TSI SER-ACNC: 0.96 UIU/ML (ref 0.55–4.78)
UROBILINOGEN UR STRIP-ACNC: NORMAL
VIT D+METAB SERPL-MCNC: 46.2 NG/ML (ref 30–100)
VLDLC SERPL CALC-MCNC: 27 MG/DL (ref 0–30)
WBC # BLD AUTO: 4.3 X10(3) UL (ref 4–11)

## 2025-08-21 PROCEDURE — 80061 LIPID PANEL: CPT | Performed by: FAMILY MEDICINE

## 2025-08-21 PROCEDURE — 84443 ASSAY THYROID STIM HORMONE: CPT | Performed by: FAMILY MEDICINE

## 2025-08-21 PROCEDURE — 82306 VITAMIN D 25 HYDROXY: CPT | Performed by: FAMILY MEDICINE

## 2025-08-21 PROCEDURE — 85025 COMPLETE CBC W/AUTO DIFF WBC: CPT | Performed by: FAMILY MEDICINE

## 2025-08-21 PROCEDURE — 81003 URINALYSIS AUTO W/O SCOPE: CPT | Performed by: FAMILY MEDICINE

## 2025-08-21 PROCEDURE — 80053 COMPREHEN METABOLIC PANEL: CPT | Performed by: FAMILY MEDICINE

## 2025-08-21 RX ORDER — ERYTHROMYCIN 20 MG/G
GEL TOPICAL
COMMUNITY
Start: 2025-08-15

## 2025-08-23 ENCOUNTER — RESULTS FOLLOW-UP (OUTPATIENT)
Dept: FAMILY MEDICINE CLINIC | Facility: CLINIC | Age: 50
End: 2025-08-23

## 2025-08-23 DIAGNOSIS — M25.512 ACUTE PAIN OF LEFT SHOULDER: Primary | ICD-10-CM

## 2025-08-25 ENCOUNTER — HOSPITAL ENCOUNTER (OUTPATIENT)
Dept: GENERAL RADIOLOGY | Facility: HOSPITAL | Age: 50
Discharge: HOME | End: 2025-08-25
Attending: FAMILY MEDICINE

## 2025-08-25 DIAGNOSIS — M25.512 ACUTE PAIN OF LEFT SHOULDER: ICD-10-CM

## 2025-08-25 PROCEDURE — 73030 X-RAY EXAM OF SHOULDER: CPT | Performed by: FAMILY MEDICINE

## (undated) DIAGNOSIS — G47.33 OSA ON CPAP: Primary | ICD-10-CM

## (undated) DIAGNOSIS — Z01.818 PREOP EXAMINATION: ICD-10-CM

## (undated) DIAGNOSIS — Z99.89 OSA ON CPAP: Primary | ICD-10-CM

## (undated) DIAGNOSIS — G47.33 OBSTRUCTIVE SLEEP APNEA (ADULT) (PEDIATRIC): Primary | ICD-10-CM

## (undated) DIAGNOSIS — Z12.11 SPECIAL SCREENING FOR MALIGNANT NEOPLASMS, COLON: ICD-10-CM

## (undated) DEVICE — SWAB PROCTO 16\" NON-STERILE

## (undated) DEVICE — REDUCER FITTING (NON-STERILE) 7/8 IN (22 MM) TO 1/4 IN (6.4 MM): Brand: CONMED BUFFALO FILTER

## (undated) DEVICE — NEEDLE SPINAL 20X3-1/2 YELLOW

## (undated) DEVICE — GLOVE SURG TRIUMPH SZ 6

## (undated) DEVICE — ELECTRODE BALL 5MM DBL-511

## (undated) DEVICE — GYN CDS: Brand: MEDLINE INDUSTRIES, INC.

## (undated) DEVICE — ELECTRODE LOOP WHITE

## (undated) DEVICE — KENDALL SCD EXPRESS SLEEVES, KNEE LENGTH, MEDIUM: Brand: KENDALL SCD

## (undated) DEVICE — SOL  .9 1000ML BTL

## (undated) DEVICE — SUTURE SILK 3-0 SH

## (undated) DEVICE — CAUTERY PENCIL

## (undated) DEVICE — REM POLYHESIVE ADULT PATIENT RETURN ELECTRODE: Brand: VALLEYLAB

## (undated) NOTE — LETTER
Medical Grade Compression Hose     Patient: Kaur Richardson     YOB: 1975                 Diagnosis: Symptomatic Venous Insufficiency: I87.2  Compression: 20-30mmHg- Moderate  Style: Thigh-High        Amount: X 2  HCPS: - Thigh High          Physician Signature: _____________________  Date: 4/17/2025     SAMANTHA Vogt

## (undated) NOTE — LETTER
4/28/2023          To Whom It May Concern:    Saran Broussard is currently under our medical care. She was examined by Dr. Ce Haynes. I have reviewed chart, most recent labs, EKG stress test.  She is medically cleared to proceed with infertility treatments. If you require additional information please contact our office. Sincerely,     Eder Sanford MD for  Andrea Can, DO          Document generated by: Martina Pimentel MD

## (undated) NOTE — LETTER
9/6/2024          Kaur Richardson        [address] [place] [address] [place] [id number]         To Whom It May Concern,    Kaur Richardson is currently under my care. She has a normal electrocardiogram result and is no longer required to get a stress test. She is medically cleared to move forward in the process of undergoing in vitro fertilization as planned.     If you require additional information, please contact our office.    Sincerely,    Wang Jensen, DO  [address] [place] [id number]  Ph: [phone]  Fax: [phone]             Document electronically generated by:  Marcela CHEUNG RN

## (undated) NOTE — LETTER
11/30/2021      REGARDING:        Ricardo Dueñas        [address] [place] [id number]         To whom it may concern:    Ricardo Dueñas is currently under my medical care and may not return to work at this time.     Please